# Patient Record
Sex: FEMALE | Race: WHITE | Employment: UNEMPLOYED | URBAN - METROPOLITAN AREA
[De-identification: names, ages, dates, MRNs, and addresses within clinical notes are randomized per-mention and may not be internally consistent; named-entity substitution may affect disease eponyms.]

---

## 2017-01-17 ENCOUNTER — HOSPITAL ENCOUNTER (OUTPATIENT)
Dept: PREADMISSION TESTING | Age: 57
Discharge: HOME | End: 2017-01-17
Payer: MEDICARE

## 2017-01-17 ENCOUNTER — HOSPITAL ENCOUNTER (OUTPATIENT)
Dept: GENERAL RADIOLOGY | Age: 57
Discharge: HOME | End: 2017-01-17
Attending: ORTHOPAEDIC SURGERY
Payer: MEDICARE

## 2017-01-17 VITALS
OXYGEN SATURATION: 98 % | TEMPERATURE: 98.6 F | HEIGHT: 67 IN | DIASTOLIC BLOOD PRESSURE: 69 MMHG | RESPIRATION RATE: 17 BRPM | SYSTOLIC BLOOD PRESSURE: 126 MMHG | WEIGHT: 168.43 LBS | BODY MASS INDEX: 26.44 KG/M2

## 2017-01-17 LAB
ABO + RH BLD: NORMAL
ALBUMIN SERPL BCP-MCNC: 4.1 G/DL (ref 3.5–5)
ALBUMIN/GLOB SERPL: 1.2 {RATIO} (ref 1.1–2.2)
ALP SERPL-CCNC: 113 U/L (ref 45–117)
ALT SERPL-CCNC: 24 U/L (ref 12–78)
ANION GAP BLD CALC-SCNC: 7 MMOL/L (ref 5–15)
APPEARANCE UR: CLEAR
APTT PPP: 28.7 SEC (ref 22.1–32.5)
AST SERPL W P-5'-P-CCNC: 18 U/L (ref 15–37)
ATRIAL RATE: 62 BPM
BACTERIA URNS QL MICRO: NEGATIVE /HPF
BASOPHILS # BLD AUTO: 0 K/UL (ref 0–0.1)
BASOPHILS # BLD: 0 % (ref 0–1)
BILIRUB SERPL-MCNC: 0.2 MG/DL (ref 0.2–1)
BILIRUB UR QL: NEGATIVE
BLOOD GROUP ANTIBODIES SERPL: NORMAL
BUN SERPL-MCNC: 8 MG/DL (ref 6–20)
BUN/CREAT SERPL: 9 (ref 12–20)
CALCIUM SERPL-MCNC: 9 MG/DL (ref 8.5–10.1)
CALCULATED P AXIS, ECG09: 53 DEGREES
CALCULATED R AXIS, ECG10: 60 DEGREES
CALCULATED T AXIS, ECG11: 36 DEGREES
CHLORIDE SERPL-SCNC: 101 MMOL/L (ref 97–108)
CO2 SERPL-SCNC: 32 MMOL/L (ref 21–32)
COLOR UR: ABNORMAL
CREAT SERPL-MCNC: 0.85 MG/DL (ref 0.55–1.02)
DIAGNOSIS, 93000: NORMAL
EOSINOPHIL # BLD: 0 K/UL (ref 0–0.4)
EOSINOPHIL NFR BLD: 0 % (ref 0–7)
EPITH CASTS URNS QL MICRO: ABNORMAL /LPF
ERYTHROCYTE [DISTWIDTH] IN BLOOD BY AUTOMATED COUNT: 13.5 % (ref 11.5–14.5)
EST. AVERAGE GLUCOSE BLD GHB EST-MCNC: 114 MG/DL
GLOBULIN SER CALC-MCNC: 3.3 G/DL (ref 2–4)
GLUCOSE SERPL-MCNC: 85 MG/DL (ref 65–100)
GLUCOSE UR STRIP.AUTO-MCNC: NEGATIVE MG/DL
HBA1C MFR BLD: 5.6 % (ref 4.2–6.3)
HCT VFR BLD AUTO: 41.2 % (ref 35–47)
HGB BLD-MCNC: 12.5 G/DL (ref 11.5–16)
HGB UR QL STRIP: ABNORMAL
HYALINE CASTS URNS QL MICRO: ABNORMAL /LPF (ref 0–5)
INR PPP: 0.9 (ref 0.9–1.1)
KETONES UR QL STRIP.AUTO: NEGATIVE MG/DL
LEUKOCYTE ESTERASE UR QL STRIP.AUTO: NEGATIVE
LYMPHOCYTES # BLD AUTO: 30 % (ref 12–49)
LYMPHOCYTES # BLD: 2.1 K/UL (ref 0.8–3.5)
MCH RBC QN AUTO: 27.3 PG (ref 26–34)
MCHC RBC AUTO-ENTMCNC: 30.3 G/DL (ref 30–36.5)
MCV RBC AUTO: 90 FL (ref 80–99)
MONOCYTES # BLD: 0.6 K/UL (ref 0–1)
MONOCYTES NFR BLD AUTO: 9 % (ref 5–13)
NEUTS SEG # BLD: 4.2 K/UL (ref 1.8–8)
NEUTS SEG NFR BLD AUTO: 61 % (ref 32–75)
NITRITE UR QL STRIP.AUTO: NEGATIVE
P-R INTERVAL, ECG05: 144 MS
PH UR STRIP: 5.5 [PH] (ref 5–8)
PLATELET # BLD AUTO: 245 K/UL (ref 150–400)
POTASSIUM SERPL-SCNC: 4.8 MMOL/L (ref 3.5–5.1)
PROT SERPL-MCNC: 7.4 G/DL (ref 6.4–8.2)
PROT UR STRIP-MCNC: NEGATIVE MG/DL
PROTHROMBIN TIME: 9.5 SEC (ref 9–11.1)
Q-T INTERVAL, ECG07: 414 MS
QRS DURATION, ECG06: 84 MS
QTC CALCULATION (BEZET), ECG08: 420 MS
RBC # BLD AUTO: 4.58 M/UL (ref 3.8–5.2)
RBC #/AREA URNS HPF: ABNORMAL /HPF (ref 0–5)
SODIUM SERPL-SCNC: 140 MMOL/L (ref 136–145)
SP GR UR REFRACTOMETRY: 1.02 (ref 1–1.03)
SPECIMEN EXP DATE BLD: NORMAL
THERAPEUTIC RANGE,PTTT: NORMAL SECS (ref 58–77)
UA: UC IF INDICATED,UAUC: ABNORMAL
UROBILINOGEN UR QL STRIP.AUTO: 0.2 EU/DL (ref 0.2–1)
VENTRICULAR RATE, ECG03: 62 BPM
WBC # BLD AUTO: 7 K/UL (ref 3.6–11)
WBC URNS QL MICRO: ABNORMAL /HPF (ref 0–4)

## 2017-01-17 PROCEDURE — 86900 BLOOD TYPING SEROLOGIC ABO: CPT | Performed by: ORTHOPAEDIC SURGERY

## 2017-01-17 PROCEDURE — 83036 HEMOGLOBIN GLYCOSYLATED A1C: CPT | Performed by: ORTHOPAEDIC SURGERY

## 2017-01-17 PROCEDURE — 81001 URINALYSIS AUTO W/SCOPE: CPT | Performed by: ORTHOPAEDIC SURGERY

## 2017-01-17 PROCEDURE — 71020 XR CHEST PA LAT: CPT

## 2017-01-17 PROCEDURE — 36415 COLL VENOUS BLD VENIPUNCTURE: CPT | Performed by: ORTHOPAEDIC SURGERY

## 2017-01-17 PROCEDURE — 85730 THROMBOPLASTIN TIME PARTIAL: CPT | Performed by: ORTHOPAEDIC SURGERY

## 2017-01-17 PROCEDURE — 85025 COMPLETE CBC W/AUTO DIFF WBC: CPT | Performed by: ORTHOPAEDIC SURGERY

## 2017-01-17 PROCEDURE — 80053 COMPREHEN METABOLIC PANEL: CPT | Performed by: ORTHOPAEDIC SURGERY

## 2017-01-17 PROCEDURE — 85610 PROTHROMBIN TIME: CPT | Performed by: ORTHOPAEDIC SURGERY

## 2017-01-17 PROCEDURE — 93005 ELECTROCARDIOGRAM TRACING: CPT

## 2017-01-17 RX ORDER — VENLAFAXINE HYDROCHLORIDE 150 MG/1
1 TABLET, EXTENDED RELEASE ORAL DAILY
COMMUNITY

## 2017-01-17 RX ORDER — MINERAL OIL
180 ENEMA (ML) RECTAL
COMMUNITY

## 2017-01-17 RX ORDER — METOPROLOL SUCCINATE 25 MG/1
25 TABLET, EXTENDED RELEASE ORAL
COMMUNITY

## 2017-01-17 RX ORDER — HYDROCODONE BITARTRATE AND ACETAMINOPHEN 7.5; 325 MG/1; MG/1
1 TABLET ORAL
COMMUNITY
End: 2017-02-15

## 2017-01-17 NOTE — H&P
PAT Pre-Op History & Physical    Patient: Ruben Michel                  MRN: [id number]          SSN: [id number]  YOB: 1960          Age: 49 y. o. Sex: female                Subjective:   Patient is a 49 y.o.  female who presents with history of chronic neck pain for the past 4 months. History of prior neck surgery 1998. Rates pain as 9/10 and describes as a sharp, burning and grinding pain in posterior neck that radiates to both both arms, with numbness and tingling to the hands. Complaint of decreased  bilaterally, left greater than right. Failed JOON, and narcotic pain medications. The patient was evaluated in the surgeon's office and it was determined that the most appropriate plan of care is to proceed with surgical intervention. Patient's PCP Alessandro Chao MD    History of chronic sinus infections, current smoker for 32 years. Complaint of nasal congestion today. Complaint of history of non productive intermittent cough, denies shortness of breath, and wheezes. Denies fever. Advised if symptoms change to change to follow up with PCP and notify surgeon, verbalized understanding. Patient denies any hypertension or heart problems, stated PCP started metoprolol for heart rate control related to anxiety, per patient report. Past Medical History   Diagnosis Date    Arthritis      osteoarthritis.  spine, left knee, right hip    Back pain     Chronic pain      lower back, hips, neck    DDD (degenerative disc disease), lumbar 2015    DJD (degenerative joint disease)     GERD (gastroesophageal reflux disease)     Heart murmur     Ill-defined condition      Baker's cyst left knee    Ill-defined condition      allergic rhinitis, chronic sinusitis    Ill-defined condition 01/17/2017     overweight- BMI 26.8    Ill-defined condition 2015     positive nasal MRSA swab     Lumbar scoliosis 2015    Psychiatric disorder      anxiety and depression    Septic bursitis 11/30/12     Started on Clindamycin but only took it 1.5 weeks and stopped d/t reflux    Spinal stenosis, lumbar 2015      Past Surgical History   Procedure Laterality Date    Hx cervical fusion  1998     [facility] [address] [facility] Hx lumbar fusion  4/28/15    Hx gyn       hysterectomy     Hx other surgical       skin graft, to face 40 years ago r/t allergic reaction    Hx heent       TMJ surgery- 30 years ago again 1993    Hx orthopaedic Left 1992     hand surgery    Hx orthopaedic Left 2002     wrist surgery    Hx orthopaedic       cervical fusion    Hx orthopaedic  2015     lumbar fusion L3-L5      Prior to Admission medications    Medication Sig Start Date End Date Taking? Authorizing Provider   MULTIVITS,CA,MINERALS/IRON/FA (ONE-A-DAY WOMENS FORMULA PO) Take 1 Gum by mouth daily. Yes Historical Provider   HYDROcodone-acetaminophen (NORCO) 7.5-325 mg per tablet Take 1 Tab by mouth every six (6) hours as needed for Pain. Yes Historical Provider   Venlafaxine 150 mg tr24 Take 1 Tab by mouth daily. Yes Historical Provider   fexofenadine (ALLEGRA) 180 mg tablet Take 180 mg by mouth daily as needed for Allergies. Indications: URTICARIA   Yes Historical Provider   metoprolol succinate (TOPROL-XL) 25 mg XL tablet Take 25 mg by mouth nightly. Yes Historical Provider   omeprazole (PRILOSEC OTC) 20 mg tablet Take 20 mg by mouth daily as needed. Yes Historical Provider     Current Outpatient Prescriptions   Medication Sig    MULTIVITS,CA,MINERALS/IRON/FA (ONE-A-DAY WOMENS FORMULA PO) Take 1 Gum by mouth daily.  HYDROcodone-acetaminophen (NORCO) 7.5-325 mg per tablet Take 1 Tab by mouth every six (6) hours as needed for Pain.  Venlafaxine 150 mg tr24 Take 1 Tab by mouth daily.  fexofenadine (ALLEGRA) 180 mg tablet Take 180 mg by mouth daily as needed for Allergies. Indications: URTICARIA    metoprolol succinate (TOPROL-XL) 25 mg XL tablet Take 25 mg by mouth nightly.     omeprazole (PRILOSEC OTC) 20 mg tablet Take 20 mg by mouth daily as needed. No current facility-administered medications for this encounter. Allergies   Allergen Reactions    Ceclor [Cefaclor] Anaphylaxis    Pcn [Penicillins] Anaphylaxis and Hives     Blistering hives causing skin peeling required skin grafts    Sulfa (Sulfonamide Antibiotics) Anaphylaxis    Aspirin Nausea Only    Codeine Nausea and Vomiting    Erythromycin Itching    Nsaids (Non-Steroidal Anti-Inflammatory Drug) Nausea Only      Social History   Substance Use Topics    Smoking status: Current Every Day Smoker     Packs/day: 1.00     Years: 32.00    Smokeless tobacco: Never Used      Comment: down to 10 cigarettes per day as of 1/17/2017    Alcohol use No      Comment: quit 10 years ago      Family History   Problem Relation Age of Onset    Heart Disease Mother      CHF    Sleep Apnea Mother     Diabetes Mother     Coronary Artery Disease Mother      coronary stent after age 48   [address] [facility] [address] Other Mother      blood clots    Heart Failure Mother     Crohn's Disease Father     Cancer Father      prostate    Depression Sister     MS Brother         Review of Systems    Patient denies visual disturbances, mouth sores, and loose teeth. Complaint of some nasal congestion, denies purulent drainage, denies fever, chills or sweats. Complaint of non productive intermittent cough, denies shortness of breath, and wheezes. Denies chest pain, tightness, palpitations, dizziness, and lightheadedness. Denies diarrhea, constipation, and abdominal pain. Patient denies urinary problems including dysuria, hesitancy, urgency, or incontinence. Complaint of neck pain that radiates down both arms. Complaint of intermittent numbness and tingling in bilateral arms. Complaint of decreased  bilaterally, left greater than right. Complaint of chronic back pain. Denies skin breakdown, rashes, insect bites or open area.  Denies excessive urination, excessive thirst, fatigue and malaise. Objective:     Patient Vitals for the past 24 hrs:   Temp Resp BP SpO2   17 1019 98.6 °F (37 °C) 17 126/69 98 %   HR 72  Wt Readings from Last 1 Encounters:   17 76.4 kg (168 lb 6.9 oz)     Body mass index is 26.78 kg/(m^2). Temp (24hrs), Av.6 °F (37 °C), Min:98.6 °F (37 °C), Max:98.6 °F (37 °C)    Physical Exam:     General: Pleasant, cooperative, no apparent distress, appears stated age. Eyes: Conjunctivae/corneas clear. EOMs intact. Nose: Nares normal.   Mouth/Throat: Lips, mucosa, and tongue normal. Upper dentures in place. Missing lower morals on both sides other lower teeth and gums intact. Neck: Supple, symmetrical, trachea midline. No carotid bruits. ROM in neck is limited by pain. Back: Symmetric. Lungs: Clear to auscultation bilaterally. Heart: Regular rate and rhythm, S1, S2 normal. No murmur, click, rub or gallop. Abdomen: Soft, non-tender. No distension. Bowel sounds normal.       Musculoskeletal:  Tests normal strength in all 4 extremities. Equal . Good dorsal and plantar flexion. ROM in arms limited due pain. Gait is antalgic. Extremities:  Extremities normal, atraumatic, no cyanosis or edema. Calves supple, non tender to palpation. Pulses: 2+ and symmetric bilateral upper extremities. Skin: Skin color, texture, turgor normal.  No rashes or lesions. Lymph nodes: No adenopathy. Neurologic: Alert and oriented to person, place and time. CN II-XII grossly intact. Labs:   Recent Results (from the past 67 hour(s))   CULTURE, MRSA    Collection Time: 17 10:57 AM   Result Value Ref Range    Special Requests: NO SPECIAL REQUESTS      Culture result: MRSA PRESENT      Culture result:            Screening of patient nares for MRSA is for surveillance purposes and, if positive, to facilitate isolation considerations in high risk settings.  It is not intended for automatic decolonization interventions per se as regimens are not sufficiently effective to warrant routine use. CBC WITH AUTOMATED DIFF    Collection Time: 01/17/17 11:13 AM   Result Value Ref Range    WBC 7.0 3.6 - 11.0 K/uL    RBC 4.58 3.80 - 5.20 M/uL    HGB 12.5 11.5 - 16.0 g/dL    HCT 41.2 35.0 - 47.0 %    MCV 90.0 80.0 - 99.0 FL    MCH 27.3 26.0 - 34.0 PG    MCHC 30.3 30.0 - 36.5 g/dL    RDW 13.5 11.5 - 14.5 %    PLATELET 566 525 - 547 K/uL    NEUTROPHILS 61 32 - 75 %    LYMPHOCYTES 30 12 - 49 %    MONOCYTES 9 5 - 13 %    EOSINOPHILS 0 0 - 7 %    BASOPHILS 0 0 - 1 %    ABS. NEUTROPHILS 4.2 1.8 - 8.0 K/UL    ABS. LYMPHOCYTES 2.1 0.8 - 3.5 K/UL    ABS. MONOCYTES 0.6 0.0 - 1.0 K/UL    ABS. EOSINOPHILS 0.0 0.0 - 0.4 K/UL    ABS. BASOPHILS 0.0 0.0 - 0.1 K/UL   METABOLIC PANEL, COMPREHENSIVE    Collection Time: 01/17/17 11:13 AM   Result Value Ref Range    Sodium 140 136 - 145 mmol/L    Potassium 4.8 3.5 - 5.1 mmol/L    Chloride 101 97 - 108 mmol/L    CO2 32 21 - 32 mmol/L    Anion gap 7 5 - 15 mmol/L    Glucose 85 65 - 100 mg/dL    BUN 8 6 - 20 MG/DL    Creatinine 0.85 0.55 - 1.02 MG/DL    BUN/Creatinine ratio 9 (L) 12 - 20      GFR est AA >60 >60 ml/min/1.73m2    GFR est non-AA >60 >60 ml/min/1.73m2    Calcium 9.0 8.5 - 10.1 MG/DL    Bilirubin, total 0.2 0.2 - 1.0 MG/DL    ALT 24 12 - 78 U/L    AST 18 15 - 37 U/L    Alk.  phosphatase 113 45 - 117 U/L    Protein, total 7.4 6.4 - 8.2 g/dL    Albumin 4.1 3.5 - 5.0 g/dL    Globulin 3.3 2.0 - 4.0 g/dL    A-G Ratio 1.2 1.1 - 2.2     HEMOGLOBIN A1C WITH EAG    Collection Time: 01/17/17 11:13 AM   Result Value Ref Range    Hemoglobin A1c 5.6 4.2 - 6.3 %    Est. average glucose 114 mg/dL   PROTHROMBIN TIME + INR    Collection Time: 01/17/17 11:13 AM   Result Value Ref Range    INR 0.9 0.9 - 1.1      Prothrombin time 9.5 9.0 - 11.1 sec   PTT    Collection Time: 01/17/17 11:13 AM   Result Value Ref Range    aPTT 28.7 22.1 - 32.5 sec    aPTT, therapeutic range     58.0 - 77.0 SECS   URINALYSIS W/ REFLEX CULTURE    Collection Time: 01/17/17 11:13 AM   Result Value Ref Range    Color YELLOW/STRAW      Appearance CLEAR CLEAR      Specific gravity 1.021 1.003 - 1.030      pH (UA) 5.5 5.0 - 8.0      Protein NEGATIVE  NEG mg/dL    Glucose NEGATIVE  NEG mg/dL    Ketone NEGATIVE  NEG mg/dL    Bilirubin NEGATIVE  NEG      Blood SMALL (A) NEG      Urobilinogen 0.2 0.2 - 1.0 EU/dL    Nitrites NEGATIVE  NEG      Leukocyte Esterase NEGATIVE  NEG      WBC 0-4 0 - 4 /hpf    RBC 5-10 0 - 5 /hpf    Epithelial cells FEW FEW /lpf    Bacteria NEGATIVE  NEG /hpf    UA:UC IF INDICATED CULTURE NOT INDICATED BY UA RESULT CNI      Hyaline Cast 0-2 0 - 5 /lpf   TYPE & SCREEN    Collection Time: 01/17/17 11:13 AM   Result Value Ref Range    Crossmatch Expiration 01/31/2017     ABO/Rh(D) A POSITIVE     Antibody screen NEG    EKG, 12 LEAD, INITIAL    Collection Time: 01/17/17 11:45 AM   Result Value Ref Range    Ventricular Rate 62 BPM    Atrial Rate 62 BPM    P-R Interval 144 ms    QRS Duration 84 ms    Q-T Interval 414 ms    QTC Calculation (Bezet) 420 ms    Calculated P Axis 53 degrees    Calculated R Axis 60 degrees    Calculated T Axis 36 degrees    Diagnosis       Normal sinus rhythm  Normal ECG  When compared with ECG of 21-APR-2015 13:33,  No significant change was found  Confirmed by Izzy Holman MD ([id number]) on 1/17/2017 2:39:59 PM     CXR  Result Information   Status Provider Status      Final result (Exam End: 1/17/2017 11:58 AM) Open    Study Result   INDICATION: Smoking history, heart murmur, back pain      COMPARISON: 4/21/2015     FINDINGS: PA and lateral views of the chest demonstrate a stable  cardiomediastinal silhouette and clear lungs bilaterally. The visualized osseous  structures are unremarkable.     IMPRESSION: No acute process       Assessment:     Cervical spinal stenosis [M48.02]    Plan:     Scheduled for Posterior C7-T1 foraminotomy and fusion C6--T1 with lateral mass screws. Labs, CXR and EKG done per surgeon's orders.  Labs, CXR and EKG reviewed, unremarkable.         Donna Steinberg, NP

## 2017-01-17 NOTE — PERIOP NOTES
[facility]  PREOPERATIVE INSTRUCTIONS    Surgery Date:   1/31/2017  Surgery arrival time given by surgeon: NO   If [facility] staff will call you between 4 PM- 8 PM the day before surgery with your arrival time. If your surgery is on a Monday, we will call you the preceding Friday. Please call [phone] after 8 PM if you did not receive your arrival time. 1. Please report at the designated time to the [address] [place] [address] [address] [facility]. Bring your insurance card, photo identification, and any copayment ( if applicable). 2. You must have a responsible adult to drive you home. You need to have a responsible adult to stay with you the first 24 hours after surgery if you are going home the same day of your surgery and you should not drive a car for 24 hours following your surgery. 3. Nothing to eat or drink after midnight the night before surgery. This includes no water, gum, mints, coffee, juice, etc.  Please note special instructions, if applicable, below for medications. 4. MEDICATIONS TO TAKE THE MORNING OF SURGERY WITH A SIP OF WATER: _____venlafaxineER ,omeprazole,allegra if needed_________        Your prescription pain medicine may be taken with a sip of water the morning of surgery  5. No alcoholic beverages 24 hours before or after your surgery. 6. If you are being admitted to the hospital, please leave personal belongings/luggage in your car until you have an assigned hospital room number. 7. Stop Aspirin and/or any non-steroidal anti-inflammatory drugs (i.e. Ibuprofen, Naproxen, Advil, Aleve) as directed by your surgeon. You may take Tylenol. 8. Stop herbal supplements 1 week prior to surgery. 9. If you are currently taking Plavix, Coumadin,or any other blood-thinning/anticoagulant medication contact your surgeon for instructions. 10. Please wear comfortable clothes. Wear your glasses instead of contacts. We ask that all money, jewelry and valuables be left at home.  Wear no make-up, particularly mascara, the day of surgery. 11.  All body piercings, rings and jewelry need to be removed and left at home. Please wear your hair loose or down. Please no pony-tails, buns, or any metal hair accessories. If you shower the morning of surgery, please do not apply any lotions, powders, or deodorants afterwards. Do not shave any body area within 24 hours of your surgery. 12. Please follow all instructions to avoid any potential surgical cancellation. 13. Should your physical condition change, (i.e. fever, cold, flu, etc.) please notify your surgeon as soon as possible. 14. It is important to be on time. If a situation occurs where you may be delayed, please call:  [phone] / [phone] on the day of surgery. 15. The Preadmission Testing staff can be reached at [phone]. [place] 12. Bring your completed Medication Reconciliation sheet with your the morning of surgery  Special instructions:   · Free  Parking between [address]  The patient was contacted  in person. She  verbalizes  understanding of all instructions   Medications reviewed and med reconciliation sheets for prescriptions given to patient for review & return day of surgery. Special Instructions:  · Use Chlorhexidine Care Fusion wash and sponges 3 days prior to surgery as instructed. · Incentive spirometer given with instructions to practice at home and bring back to the hospital on the day of surgery. · [facility] will contact you if your Hemoglobin A1C is greater than 7.5. · Ensure/Glucerna  sample, nutritional information, and Ensure/Glucerna coupon given. · Pain pamphlet and Call Don't Fall reminder reviewed with patient.   ·  parking is complimentary Monday - Friday 7 am - 5 pm  · Bring PTA Medication list day of surgery with the last doses taken documented

## 2017-01-18 LAB
BACTERIA SPEC CULT: NORMAL
BACTERIA SPEC CULT: NORMAL
SERVICE CMNT-IMP: NORMAL

## 2017-01-23 NOTE — PERIOP NOTES
Patient's DOS has changed from 1/31/2017 to 1/24/2017. MRSA plan has not been received as of yet from Dr. Maria Esther Mcgregor office. Left a VM for Jamee Gore at Dr. Maria Esther Mcgregor office requesting the signed MRSA plan and also orders with new DOS, 1/24/2017. Asked that these documents be faxed to main preop's fax number.

## 2017-02-03 NOTE — PERIOP NOTES
Elan Arroyo from Dr. Eloy Mireles office called to inquire how the patient could get another set of HCG sponges. Informed her to have patient come to the  of [facility] and our  will provide her with them. No need to register. Also requested new orders with new DOS. DOS: 2/14/2017.

## 2017-02-12 ENCOUNTER — ANESTHESIA EVENT (OUTPATIENT)
Dept: SURGERY | Age: 57
DRG: 473 | End: 2017-02-12
Payer: MEDICARE

## 2017-02-13 ENCOUNTER — APPOINTMENT (OUTPATIENT)
Dept: GENERAL RADIOLOGY | Age: 57
DRG: 473 | End: 2017-02-13
Attending: ORTHOPAEDIC SURGERY
Payer: MEDICARE

## 2017-02-13 ENCOUNTER — HOSPITAL ENCOUNTER (INPATIENT)
Age: 57
LOS: 2 days | Discharge: HOME HEALTH | DRG: 473 | End: 2017-02-15
Attending: ORTHOPAEDIC SURGERY | Admitting: ORTHOPAEDIC SURGERY
Payer: MEDICARE

## 2017-02-13 ENCOUNTER — ANESTHESIA (OUTPATIENT)
Dept: SURGERY | Age: 57
DRG: 473 | End: 2017-02-13
Payer: MEDICARE

## 2017-02-13 ENCOUNTER — SURGERY (OUTPATIENT)
Age: 57
End: 2017-02-13

## 2017-02-13 PROBLEM — M48.02 FORAMINAL STENOSIS OF CERVICAL REGION: Status: ACTIVE | Noted: 2017-02-13

## 2017-02-13 PROBLEM — S12.9XXA PSEUDOARTHROSIS OF CERVICAL SPINE (HCC): Status: ACTIVE | Noted: 2017-02-13

## 2017-02-13 PROBLEM — M48.02 CERVICAL STENOSIS OF SPINE: Status: ACTIVE | Noted: 2017-02-13

## 2017-02-13 LAB
ABO + RH BLD: NORMAL
BLOOD GROUP ANTIBODIES SERPL: NORMAL
SPECIMEN EXP DATE BLD: NORMAL

## 2017-02-13 PROCEDURE — C1713 ANCHOR/SCREW BN/BN,TIS/BN: HCPCS | Performed by: ORTHOPAEDIC SURGERY

## 2017-02-13 PROCEDURE — 77030013079 HC BLNKT BAIR HGGR 3M -A: Performed by: NURSE ANESTHETIST, CERTIFIED REGISTERED

## 2017-02-13 PROCEDURE — 74011250636 HC RX REV CODE- 250/636: Performed by: ANESTHESIOLOGY

## 2017-02-13 PROCEDURE — 74011250637 HC RX REV CODE- 250/637: Performed by: PHYSICIAN ASSISTANT

## 2017-02-13 PROCEDURE — 77030034849: Performed by: ORTHOPAEDIC SURGERY

## 2017-02-13 PROCEDURE — 0RG4071 FUSION OF CERVICOTHORACIC VERTEBRAL JOINT WITH AUTOLOGOUS TISSUE SUBSTITUTE, POSTERIOR APPROACH, POSTERIOR COLUMN, OPEN APPROACH: ICD-10-PCS | Performed by: ORTHOPAEDIC SURGERY

## 2017-02-13 PROCEDURE — 77030018846 HC SOL IRR STRL H20 ICUM -A: Performed by: ORTHOPAEDIC SURGERY

## 2017-02-13 PROCEDURE — 77030029099 HC BN WAX SSPC -A: Performed by: ORTHOPAEDIC SURGERY

## 2017-02-13 PROCEDURE — 77030004402 HC BUR NEUR STRY -C: Performed by: ORTHOPAEDIC SURGERY

## 2017-02-13 PROCEDURE — 74011250636 HC RX REV CODE- 250/636: Performed by: ORTHOPAEDIC SURGERY

## 2017-02-13 PROCEDURE — 77030008771 HC TU NG SALEM SUMP -A: Performed by: NURSE ANESTHETIST, CERTIFIED REGISTERED

## 2017-02-13 PROCEDURE — 77030018673: Performed by: ORTHOPAEDIC SURGERY

## 2017-02-13 PROCEDURE — 77030003666 HC NDL SPINAL BD -A: Performed by: ORTHOPAEDIC SURGERY

## 2017-02-13 PROCEDURE — 76010000172 HC OR TIME 2.5 TO 3 HR INTENSV-TIER 1: Performed by: ORTHOPAEDIC SURGERY

## 2017-02-13 PROCEDURE — L0172 CERV COL SR FOAM 2PC PRE OTS: HCPCS | Performed by: ORTHOPAEDIC SURGERY

## 2017-02-13 PROCEDURE — 74011250636 HC RX REV CODE- 250/636

## 2017-02-13 PROCEDURE — 77030008684 HC TU ET CUF COVD -B: Performed by: NURSE ANESTHETIST, CERTIFIED REGISTERED

## 2017-02-13 PROCEDURE — 36415 COLL VENOUS BLD VENIPUNCTURE: CPT | Performed by: ORTHOPAEDIC SURGERY

## 2017-02-13 PROCEDURE — 77030019908 HC STETH ESOPH SIMS -A: Performed by: NURSE ANESTHETIST, CERTIFIED REGISTERED

## 2017-02-13 PROCEDURE — 86900 BLOOD TYPING SEROLOGIC ABO: CPT | Performed by: ORTHOPAEDIC SURGERY

## 2017-02-13 PROCEDURE — 77030032490 HC SLV COMPR SCD KNE COVD -B: Performed by: ORTHOPAEDIC SURGERY

## 2017-02-13 PROCEDURE — 76001 XR FLUOROSCOPY OVER 60 MINUTES: CPT

## 2017-02-13 PROCEDURE — 77030018723 HC ELCTRD BLD COVD -A: Performed by: ORTHOPAEDIC SURGERY

## 2017-02-13 PROCEDURE — 74011000250 HC RX REV CODE- 250

## 2017-02-13 PROCEDURE — 76060000036 HC ANESTHESIA 2.5 TO 3 HR: Performed by: ORTHOPAEDIC SURGERY

## 2017-02-13 PROCEDURE — 77030020268 HC MISC GENERAL SUPPLY: Performed by: ORTHOPAEDIC SURGERY

## 2017-02-13 PROCEDURE — 77030034475 HC MISC IMPL SPN: Performed by: ORTHOPAEDIC SURGERY

## 2017-02-13 PROCEDURE — 74011000272 HC RX REV CODE- 272: Performed by: ORTHOPAEDIC SURGERY

## 2017-02-13 PROCEDURE — 74011000250 HC RX REV CODE- 250: Performed by: ORTHOPAEDIC SURGERY

## 2017-02-13 PROCEDURE — 77030034274 HC GRFT BN CHIP ALLGRFT 10CC REGE -I: Performed by: ORTHOPAEDIC SURGERY

## 2017-02-13 PROCEDURE — 77030031139 HC SUT VCRL2 J&J -A: Performed by: ORTHOPAEDIC SURGERY

## 2017-02-13 PROCEDURE — 76210000016 HC OR PH I REC 1 TO 1.5 HR: Performed by: ORTHOPAEDIC SURGERY

## 2017-02-13 PROCEDURE — 65270000029 HC RM PRIVATE

## 2017-02-13 PROCEDURE — 77030035565 HC ST SCR SPN LOK CAP STRMLN PHOE -B: Performed by: ORTHOPAEDIC SURGERY

## 2017-02-13 PROCEDURE — 74011250636 HC RX REV CODE- 250/636: Performed by: PHYSICIAN ASSISTANT

## 2017-02-13 PROCEDURE — 77030020061 HC IV BLD WRMR ADMIN SET 3M -B: Performed by: NURSE ANESTHETIST, CERTIFIED REGISTERED

## 2017-02-13 PROCEDURE — 77030030722 HC PIN SKULL MAYFLD INLC -B: Performed by: ORTHOPAEDIC SURGERY

## 2017-02-13 PROCEDURE — 77030018836 HC SOL IRR NACL ICUM -A: Performed by: ORTHOPAEDIC SURGERY

## 2017-02-13 PROCEDURE — 77030002982 HC SUT POLYSRB J&J -A: Performed by: ORTHOPAEDIC SURGERY

## 2017-02-13 PROCEDURE — 77030020782 HC GWN BAIR PAWS FLX 3M -B

## 2017-02-13 DEVICE — SCR SET CERV LCK -- STREAMLINE CT: Type: IMPLANTABLE DEVICE | Site: POSTERIOR CERVICAL | Status: FUNCTIONAL

## 2017-02-13 DEVICE — GRAFT BNE SUB 10CC CORT CANC DBM CRYOGENICALLY PRESERVED: Type: IMPLANTABLE DEVICE | Site: POSTERIOR CERVICAL | Status: FUNCTIONAL

## 2017-02-13 DEVICE — SCR BNE SPNE POLYAXL 3.5X14MM -- STREAMLINE CT: Type: IMPLANTABLE DEVICE | Site: POSTERIOR CERVICAL | Status: FUNCTIONAL

## 2017-02-13 RX ORDER — FACIAL-BODY WIPES
10 EACH TOPICAL DAILY PRN
Status: DISCONTINUED | OUTPATIENT
Start: 2017-02-15 | End: 2017-02-15 | Stop reason: HOSPADM

## 2017-02-13 RX ORDER — SUFENTANIL CITRATE 50 UG/ML
INJECTION EPIDURAL; INTRAVENOUS
Status: DISCONTINUED | OUTPATIENT
Start: 2017-02-13 | End: 2017-02-13 | Stop reason: HOSPADM

## 2017-02-13 RX ORDER — BACITRACIN 50000 [IU]/1
50000 INJECTION, POWDER, FOR SOLUTION INTRAMUSCULAR ONCE
Status: ACTIVE | OUTPATIENT
Start: 2017-02-13 | End: 2017-02-14

## 2017-02-13 RX ORDER — MIDAZOLAM HYDROCHLORIDE 1 MG/ML
1 INJECTION, SOLUTION INTRAMUSCULAR; INTRAVENOUS AS NEEDED
Status: DISCONTINUED | OUTPATIENT
Start: 2017-02-13 | End: 2017-02-13 | Stop reason: HOSPADM

## 2017-02-13 RX ORDER — OXYCODONE HYDROCHLORIDE 5 MG/1
10 TABLET ORAL
Status: DISCONTINUED | OUTPATIENT
Start: 2017-02-13 | End: 2017-02-14

## 2017-02-13 RX ORDER — LIDOCAINE HYDROCHLORIDE 10 MG/ML
0.1 INJECTION, SOLUTION EPIDURAL; INFILTRATION; INTRACAUDAL; PERINEURAL AS NEEDED
Status: DISCONTINUED | OUTPATIENT
Start: 2017-02-13 | End: 2017-02-13 | Stop reason: HOSPADM

## 2017-02-13 RX ORDER — HYDROMORPHONE HYDROCHLORIDE 2 MG/ML
INJECTION, SOLUTION INTRAMUSCULAR; INTRAVENOUS; SUBCUTANEOUS AS NEEDED
Status: DISCONTINUED | OUTPATIENT
Start: 2017-02-13 | End: 2017-02-13 | Stop reason: HOSPADM

## 2017-02-13 RX ORDER — SODIUM CHLORIDE, SODIUM LACTATE, POTASSIUM CHLORIDE, CALCIUM CHLORIDE 600; 310; 30; 20 MG/100ML; MG/100ML; MG/100ML; MG/100ML
100 INJECTION, SOLUTION INTRAVENOUS CONTINUOUS
Status: DISCONTINUED | OUTPATIENT
Start: 2017-02-13 | End: 2017-02-13 | Stop reason: HOSPADM

## 2017-02-13 RX ORDER — ALBUTEROL SULFATE 0.83 MG/ML
2.5 SOLUTION RESPIRATORY (INHALATION) AS NEEDED
Status: DISCONTINUED | OUTPATIENT
Start: 2017-02-13 | End: 2017-02-13 | Stop reason: HOSPADM

## 2017-02-13 RX ORDER — SUCCINYLCHOLINE CHLORIDE 20 MG/ML
INJECTION INTRAMUSCULAR; INTRAVENOUS AS NEEDED
Status: DISCONTINUED | OUTPATIENT
Start: 2017-02-13 | End: 2017-02-13 | Stop reason: HOSPADM

## 2017-02-13 RX ORDER — HYDROMORPHONE HYDROCHLORIDE 1 MG/ML
0.5 INJECTION, SOLUTION INTRAMUSCULAR; INTRAVENOUS; SUBCUTANEOUS
Status: DISCONTINUED | OUTPATIENT
Start: 2017-02-13 | End: 2017-02-15 | Stop reason: HOSPADM

## 2017-02-13 RX ORDER — SUFENTANIL CITRATE 50 UG/ML
INJECTION EPIDURAL; INTRAVENOUS AS NEEDED
Status: DISCONTINUED | OUTPATIENT
Start: 2017-02-13 | End: 2017-02-13 | Stop reason: HOSPADM

## 2017-02-13 RX ORDER — AMOXICILLIN 250 MG
1 CAPSULE ORAL 2 TIMES DAILY
Status: DISCONTINUED | OUTPATIENT
Start: 2017-02-13 | End: 2017-02-15 | Stop reason: HOSPADM

## 2017-02-13 RX ORDER — MIDAZOLAM HYDROCHLORIDE 1 MG/ML
INJECTION, SOLUTION INTRAMUSCULAR; INTRAVENOUS AS NEEDED
Status: DISCONTINUED | OUTPATIENT
Start: 2017-02-13 | End: 2017-02-13 | Stop reason: HOSPADM

## 2017-02-13 RX ORDER — LIDOCAINE HYDROCHLORIDE 20 MG/ML
INJECTION, SOLUTION EPIDURAL; INFILTRATION; INTRACAUDAL; PERINEURAL AS NEEDED
Status: DISCONTINUED | OUTPATIENT
Start: 2017-02-13 | End: 2017-02-13 | Stop reason: HOSPADM

## 2017-02-13 RX ORDER — FENTANYL CITRATE 50 UG/ML
50 INJECTION, SOLUTION INTRAMUSCULAR; INTRAVENOUS AS NEEDED
Status: DISCONTINUED | OUTPATIENT
Start: 2017-02-13 | End: 2017-02-13 | Stop reason: HOSPADM

## 2017-02-13 RX ORDER — NALOXONE HYDROCHLORIDE 0.4 MG/ML
0.4 INJECTION, SOLUTION INTRAMUSCULAR; INTRAVENOUS; SUBCUTANEOUS AS NEEDED
Status: DISCONTINUED | OUTPATIENT
Start: 2017-02-13 | End: 2017-02-15 | Stop reason: HOSPADM

## 2017-02-13 RX ORDER — DIPHENHYDRAMINE HYDROCHLORIDE 50 MG/ML
12.5 INJECTION, SOLUTION INTRAMUSCULAR; INTRAVENOUS AS NEEDED
Status: DISCONTINUED | OUTPATIENT
Start: 2017-02-13 | End: 2017-02-13 | Stop reason: HOSPADM

## 2017-02-13 RX ORDER — ONDANSETRON 2 MG/ML
4 INJECTION INTRAMUSCULAR; INTRAVENOUS AS NEEDED
Status: DISCONTINUED | OUTPATIENT
Start: 2017-02-13 | End: 2017-02-13 | Stop reason: HOSPADM

## 2017-02-13 RX ORDER — SODIUM CHLORIDE 9 MG/ML
125 INJECTION, SOLUTION INTRAVENOUS CONTINUOUS
Status: DISPENSED | OUTPATIENT
Start: 2017-02-13 | End: 2017-02-14

## 2017-02-13 RX ORDER — SODIUM CHLORIDE, SODIUM LACTATE, POTASSIUM CHLORIDE, CALCIUM CHLORIDE 600; 310; 30; 20 MG/100ML; MG/100ML; MG/100ML; MG/100ML
INJECTION, SOLUTION INTRAVENOUS
Status: DISCONTINUED | OUTPATIENT
Start: 2017-02-13 | End: 2017-02-13 | Stop reason: HOSPADM

## 2017-02-13 RX ORDER — VANCOMYCIN HYDROCHLORIDE 1 G/20ML
1 INJECTION, POWDER, LYOPHILIZED, FOR SOLUTION INTRAVENOUS ONCE
Status: DISCONTINUED | OUTPATIENT
Start: 2017-02-13 | End: 2017-02-13

## 2017-02-13 RX ORDER — POLYETHYLENE GLYCOL 3350 17 G/17G
17 POWDER, FOR SOLUTION ORAL DAILY
Status: DISCONTINUED | OUTPATIENT
Start: 2017-02-14 | End: 2017-02-15 | Stop reason: HOSPADM

## 2017-02-13 RX ORDER — VANCOMYCIN HYDROCHLORIDE 1 G/20ML
INJECTION, POWDER, LYOPHILIZED, FOR SOLUTION INTRAVENOUS AS NEEDED
Status: DISCONTINUED | OUTPATIENT
Start: 2017-02-13 | End: 2017-02-13 | Stop reason: HOSPADM

## 2017-02-13 RX ORDER — PROPOFOL 10 MG/ML
INJECTION, EMULSION INTRAVENOUS AS NEEDED
Status: DISCONTINUED | OUTPATIENT
Start: 2017-02-13 | End: 2017-02-13 | Stop reason: HOSPADM

## 2017-02-13 RX ORDER — PROPOFOL 10 MG/ML
INJECTION, EMULSION INTRAVENOUS
Status: DISCONTINUED | OUTPATIENT
Start: 2017-02-13 | End: 2017-02-13 | Stop reason: HOSPADM

## 2017-02-13 RX ORDER — SODIUM CHLORIDE 0.9 % (FLUSH) 0.9 %
5-10 SYRINGE (ML) INJECTION AS NEEDED
Status: DISCONTINUED | OUTPATIENT
Start: 2017-02-13 | End: 2017-02-15 | Stop reason: HOSPADM

## 2017-02-13 RX ORDER — SODIUM CHLORIDE, SODIUM LACTATE, POTASSIUM CHLORIDE, CALCIUM CHLORIDE 600; 310; 30; 20 MG/100ML; MG/100ML; MG/100ML; MG/100ML
150 INJECTION, SOLUTION INTRAVENOUS CONTINUOUS
Status: DISCONTINUED | OUTPATIENT
Start: 2017-02-13 | End: 2017-02-13 | Stop reason: HOSPADM

## 2017-02-13 RX ORDER — FENTANYL CITRATE 50 UG/ML
INJECTION, SOLUTION INTRAMUSCULAR; INTRAVENOUS AS NEEDED
Status: DISCONTINUED | OUTPATIENT
Start: 2017-02-13 | End: 2017-02-13 | Stop reason: HOSPADM

## 2017-02-13 RX ORDER — ONDANSETRON 2 MG/ML
4 INJECTION INTRAMUSCULAR; INTRAVENOUS
Status: DISCONTINUED | OUTPATIENT
Start: 2017-02-13 | End: 2017-02-15 | Stop reason: HOSPADM

## 2017-02-13 RX ORDER — SODIUM CHLORIDE 0.9 % (FLUSH) 0.9 %
5-10 SYRINGE (ML) INJECTION EVERY 8 HOURS
Status: DISCONTINUED | OUTPATIENT
Start: 2017-02-14 | End: 2017-02-15 | Stop reason: HOSPADM

## 2017-02-13 RX ORDER — METOPROLOL SUCCINATE 25 MG/1
25 TABLET, EXTENDED RELEASE ORAL
Status: DISCONTINUED | OUTPATIENT
Start: 2017-02-13 | End: 2017-02-15 | Stop reason: HOSPADM

## 2017-02-13 RX ORDER — ACETAMINOPHEN 325 MG/1
650 TABLET ORAL EVERY 6 HOURS
Status: DISCONTINUED | OUTPATIENT
Start: 2017-02-13 | End: 2017-02-15 | Stop reason: HOSPADM

## 2017-02-13 RX ORDER — DEXAMETHASONE SODIUM PHOSPHATE 4 MG/ML
4 INJECTION, SOLUTION INTRA-ARTICULAR; INTRALESIONAL; INTRAMUSCULAR; INTRAVENOUS; SOFT TISSUE
Status: DISCONTINUED | OUTPATIENT
Start: 2017-02-13 | End: 2017-02-15 | Stop reason: HOSPADM

## 2017-02-13 RX ORDER — HYDROMORPHONE HYDROCHLORIDE 1 MG/ML
1 INJECTION, SOLUTION INTRAMUSCULAR; INTRAVENOUS; SUBCUTANEOUS
Status: DISCONTINUED | OUTPATIENT
Start: 2017-02-13 | End: 2017-02-13 | Stop reason: HOSPADM

## 2017-02-13 RX ORDER — OXYCODONE HYDROCHLORIDE 5 MG/1
5 TABLET ORAL
Status: DISCONTINUED | OUTPATIENT
Start: 2017-02-13 | End: 2017-02-14

## 2017-02-13 RX ORDER — ONDANSETRON 2 MG/ML
INJECTION INTRAMUSCULAR; INTRAVENOUS AS NEEDED
Status: DISCONTINUED | OUTPATIENT
Start: 2017-02-13 | End: 2017-02-13 | Stop reason: HOSPADM

## 2017-02-13 RX ORDER — PANTOPRAZOLE SODIUM 40 MG/1
40 TABLET, DELAYED RELEASE ORAL
Status: DISCONTINUED | OUTPATIENT
Start: 2017-02-13 | End: 2017-02-15 | Stop reason: HOSPADM

## 2017-02-13 RX ORDER — ROCURONIUM BROMIDE 10 MG/ML
INJECTION, SOLUTION INTRAVENOUS AS NEEDED
Status: DISCONTINUED | OUTPATIENT
Start: 2017-02-13 | End: 2017-02-13 | Stop reason: HOSPADM

## 2017-02-13 RX ORDER — DIAZEPAM 5 MG/1
5 TABLET ORAL
Status: DISCONTINUED | OUTPATIENT
Start: 2017-02-13 | End: 2017-02-15 | Stop reason: HOSPADM

## 2017-02-13 RX ORDER — VENLAFAXINE HYDROCHLORIDE 150 MG/1
150 CAPSULE, EXTENDED RELEASE ORAL DAILY
Status: DISCONTINUED | OUTPATIENT
Start: 2017-02-14 | End: 2017-02-15 | Stop reason: HOSPADM

## 2017-02-13 RX ADMIN — Medication 1 TABLET: at 18:58

## 2017-02-13 RX ADMIN — GELATIN ABSORBABLE SPONGE SIZE 100 1 EACH: MISC at 14:47

## 2017-02-13 RX ADMIN — MIDAZOLAM HYDROCHLORIDE 1 MG: 1 INJECTION, SOLUTION INTRAMUSCULAR; INTRAVENOUS at 14:30

## 2017-02-13 RX ADMIN — SUFENTANIL CITRATE 0.1 MCG/KG/HR: 50 INJECTION EPIDURAL; INTRAVENOUS at 13:38

## 2017-02-13 RX ADMIN — SUFENTANIL CITRATE 5 MCG: 50 INJECTION EPIDURAL; INTRAVENOUS at 14:00

## 2017-02-13 RX ADMIN — OXYCODONE HYDROCHLORIDE 10 MG: 5 TABLET ORAL at 22:42

## 2017-02-13 RX ADMIN — ROCURONIUM BROMIDE 20 MG: 10 INJECTION, SOLUTION INTRAVENOUS at 13:34

## 2017-02-13 RX ADMIN — ACETAMINOPHEN 650 MG: 325 TABLET ORAL at 18:58

## 2017-02-13 RX ADMIN — PROPOFOL 200 MG: 10 INJECTION, EMULSION INTRAVENOUS at 13:34

## 2017-02-13 RX ADMIN — SODIUM CHLORIDE, SODIUM LACTATE, POTASSIUM CHLORIDE, AND CALCIUM CHLORIDE 150 ML/HR: 600; 310; 30; 20 INJECTION, SOLUTION INTRAVENOUS at 09:51

## 2017-02-13 RX ADMIN — VANCOMYCIN HYDROCHLORIDE 1000 MG: 1 INJECTION, POWDER, LYOPHILIZED, FOR SOLUTION INTRAVENOUS at 12:35

## 2017-02-13 RX ADMIN — THROMBIN, TOPICAL (BOVINE) 20000 UNITS: KIT at 14:48

## 2017-02-13 RX ADMIN — MIDAZOLAM HYDROCHLORIDE 1.5 MG: 1 INJECTION, SOLUTION INTRAMUSCULAR; INTRAVENOUS at 15:00

## 2017-02-13 RX ADMIN — SODIUM CHLORIDE, SODIUM LACTATE, POTASSIUM CHLORIDE, CALCIUM CHLORIDE: 600; 310; 30; 20 INJECTION, SOLUTION INTRAVENOUS at 13:37

## 2017-02-13 RX ADMIN — FENTANYL CITRATE 100 MCG: 50 INJECTION, SOLUTION INTRAMUSCULAR; INTRAVENOUS at 16:00

## 2017-02-13 RX ADMIN — VANCOMYCIN HYDROCHLORIDE 1 G: 1 INJECTION, POWDER, LYOPHILIZED, FOR SOLUTION INTRAVENOUS at 15:51

## 2017-02-13 RX ADMIN — PROPOFOL 50 MCG/KG/MIN: 10 INJECTION, EMULSION INTRAVENOUS at 13:38

## 2017-02-13 RX ADMIN — SODIUM CHLORIDE 125 ML/HR: 900 INJECTION, SOLUTION INTRAVENOUS at 18:58

## 2017-02-13 RX ADMIN — SODIUM CHLORIDE: 900 IRRIGANT IRRIGATION at 14:47

## 2017-02-13 RX ADMIN — METOPROLOL SUCCINATE 25 MG: 25 TABLET, FILM COATED, EXTENDED RELEASE ORAL at 21:30

## 2017-02-13 RX ADMIN — MIDAZOLAM HYDROCHLORIDE 2.5 MG: 1 INJECTION, SOLUTION INTRAMUSCULAR; INTRAVENOUS at 13:28

## 2017-02-13 RX ADMIN — HYDROMORPHONE HYDROCHLORIDE 0.5 MG: 2 INJECTION, SOLUTION INTRAMUSCULAR; INTRAVENOUS; SUBCUTANEOUS at 15:59

## 2017-02-13 RX ADMIN — SODIUM CHLORIDE, SODIUM LACTATE, POTASSIUM CHLORIDE, AND CALCIUM CHLORIDE: 600; 310; 30; 20 INJECTION, SOLUTION INTRAVENOUS at 14:15

## 2017-02-13 RX ADMIN — SUFENTANIL CITRATE 5 MCG: 50 INJECTION EPIDURAL; INTRAVENOUS at 14:40

## 2017-02-13 RX ADMIN — VANCOMYCIN HYDROCHLORIDE 1000 MG: 1 INJECTION, POWDER, LYOPHILIZED, FOR SOLUTION INTRAVENOUS at 21:30

## 2017-02-13 RX ADMIN — FENTANYL CITRATE 50 MCG: 50 INJECTION, SOLUTION INTRAMUSCULAR; INTRAVENOUS at 14:30

## 2017-02-13 RX ADMIN — FENTANYL CITRATE 50 MCG: 50 INJECTION, SOLUTION INTRAMUSCULAR; INTRAVENOUS at 13:53

## 2017-02-13 RX ADMIN — LIDOCAINE HYDROCHLORIDE 60 MG: 20 INJECTION, SOLUTION EPIDURAL; INFILTRATION; INTRACAUDAL; PERINEURAL at 13:34

## 2017-02-13 RX ADMIN — FENTANYL CITRATE 50 MCG: 50 INJECTION, SOLUTION INTRAMUSCULAR; INTRAVENOUS at 13:28

## 2017-02-13 RX ADMIN — ONDANSETRON 4 MG: 2 INJECTION INTRAMUSCULAR; INTRAVENOUS at 15:45

## 2017-02-13 RX ADMIN — SUCCINYLCHOLINE CHLORIDE 100 MG: 20 INJECTION INTRAMUSCULAR; INTRAVENOUS at 13:34

## 2017-02-13 NOTE — ANESTHESIA POSTPROCEDURE EVALUATION
Post-Anesthesia Evaluation and Assessment    Patient: Ovidio Severance MRN: [id number]  SSN: [id number]    YOB: 1960  Age: 49 y.o. Sex: female       Cardiovascular Function/Vital Signs  Visit Vitals    /79    Pulse 94    Temp 36.7 °C (98.1 °F)    Resp 10    Ht 5' 6\" (1.676 m)    Wt 77 kg (169 lb 12.1 oz)    SpO2 94%    BMI 27.4 kg/m2       Patient is status post general anesthesia for Procedure(s):  POSTERIOR C7-T1 FORAMINOTOMY AND FUSION, C6-T1 WITH LATERAL MASS SCREWS. Nausea/Vomiting: None    Postoperative hydration reviewed and adequate. Pain:  Pain Scale 1: Numeric (0 - 10) (02/13/17 1720)  Pain Intensity 1: 0 (02/13/17 1720)   Managed    Neurological Status:   Neuro (WDL): Within Defined Limits ( and plantar flexion equal and strong) (02/13/17 1720)  Neuro  LUE Motor Response: Purposeful (02/13/17 1720)  LLE Motor Response: Purposeful (02/13/17 1720)  RUE Motor Response: Purposeful (02/13/17 1720)  RLE Motor Response: Purposeful (02/13/17 1720)   At baseline    Mental Status and Level of Consciousness: Arousable    Pulmonary Status:   O2 Device: Nasal cannula (02/13/17 1720)   Adequate oxygenation and airway patent    Complications related to anesthesia: None    Post-anesthesia assessment completed.  No concerns    Signed By: Zoltan Carter DO     February 13, 2017

## 2017-02-13 NOTE — PERIOP NOTES
Pt fall protocol  Yellow arm band on patient, yellow non skid socks on   bed in low position, all side rails up, call bell in reach  Pt  & family instructed in \"call don't fall\" protocol     Use your call bell,and wait for assistance, staff not family will assist you to get up &   move about  Pt & family verbalize understanding of fall precautions and \"call don't fall\" protocol]    0945 - pt made aware of +MRSA nasal swab and precautions taken per staff and family members. Verbalized understanding, questions/concerns denied. Pt aware that will receive abx therapy for such.

## 2017-02-13 NOTE — IP AVS SNAPSHOT
[address] 
[phone] Patient: Dell Mccray MRN: [id number] NFA:[id number] You are allergic to the following Allergen Reactions Ceclor (Cefaclor) Anaphylaxis Pcn (Penicillins) Anaphylaxis Hives Blistering hives causing skin peeling required skin grafts Sulfa (Sulfonamide Antibiotics) Anaphylaxis Aspirin Nausea Only Codeine Nausea and Vomiting Erythromycin Itching Nsaids (Non-Steroidal Anti-Inflammatory Drug) Nausea Only Recent Documentation Height Weight Breastfeeding? BMI OB Status Smoking Status 1.676 m 77 kg No 27.4 kg/m2 Hysterectomy Current Every Day Smoker Emergency Contacts Name Discharge Info Relation Home Work Mobile [facility] CAREGIVER [3] Sister [23]   [phone] [employer]  Life Partner [7]   [phone] About your hospitalization You were admitted on:  February 13, 2017 You last received care in the:  [facility] 4M POST SURG ORT 1 You were discharged on:  February 15, 2017 Unit phone number:  [phone] Why you were hospitalized Your primary diagnosis was:  Not on File Your diagnoses also included:  Cervical Stenosis Of Spine, Pseudoarthrosis Of Cervical Spine (Hcc), Foraminal Stenosis Of Cervical Region Providers Seen During Your Hospitalizations Provider Role Specialty Primary office phone Arelis Curry MD Attending Provider Orthopedic Surgery [phone] Your Primary Care Physician (PCP) Primary Care Physician Office Phone Office Fax Sonya Wilsonle [phone] Follow-up Information Follow up With Details Comments Contact Info Kelly Carvajal MD   [address] [place] [address] 
[phone] Current Discharge Medication List  
  
START taking these medications Dose & Instructions Dispensing Information Comments Morning Noon Evening Bedtime Calcium Citrate-Vitamin D3 500 mg calcium -400 unit Chew Your next dose is: Today, Tomorrow Other:  _________ Dose:  500 mg Take 500 mg by mouth two (2) times a day. Indications: PREVENTION OF VITAMIN D DEFICIENCY, enhances fusion Quantity:  60 Tab Refills:  2  
     
   
   
   
  
 diazePAM 5 mg tablet Commonly known as:  VALIUM Your next dose is: Today, Tomorrow Other:  _________ Dose:  5 mg Take 1 Tab by mouth every eight (8) hours as needed (Muscle Spasms). Max Daily Amount: 15 mg. Indications: MUSCLE SPASM Quantity:  40 Tab Refills:  0 HYDROmorphone 4 mg tablet Commonly known as:  DILAUDID Your next dose is: Today, Tomorrow Other:  _________ Dose:  6-8 mg Take 1.5-2 Tabs by mouth every three (3) hours as needed for Pain. Max Daily Amount: 64 mg. Quantity:  120 Tab Refills:  0  
     
   
   
   
  
 senna-docusate 8.6-50 mg per tablet Commonly known as:  Gissel Holms Your next dose is: Today, Tomorrow Other:  _________ Dose:  1 Tab Take 1 Tab by mouth two (2) times a day. Quantity:  60 Tab Refills:  1 CONTINUE these medications which have NOT CHANGED Dose & Instructions Dispensing Information Comments Morning Noon Evening Bedtime  
 fexofenadine 180 mg tablet Commonly known as:  Neeta Cazares Your next dose is: Today, Tomorrow Other:  _________ Dose:  180 mg Take 180 mg by mouth daily as needed for Allergies. Indications: URTICARIA Refills:  0  
     
   
   
   
  
 metoprolol succinate 25 mg XL tablet Commonly known as:  TOPROL-XL Your next dose is: Today, Tomorrow Other:  _________ Dose:  25 mg Take 25 mg by mouth nightly. Refills:  0 ONE-A-DAY WOMENS FORMULA PO Your next dose is: Today, Tomorrow Other:  _________ Dose:  1 Gum Take 1 Gum by mouth daily. Refills:  0 PriLOSEC OTC 20 mg tablet Generic drug:  omeprazole Your next dose is: Today, Tomorrow Other:  _________ Dose:  20 mg Take 20 mg by mouth daily as needed. Refills:  0 Venlafaxine 150 mg Tr24 Your next dose is: Today, Tomorrow Other:  _________ Dose:  1 Tab Take 1 Tab by mouth daily. Refills:  0 STOP taking these medications HYDROcodone-acetaminophen 7.5-325 mg per tablet Commonly known as:  Basilio Ornelas Where to Get Your Medications Information on where to get these meds will be given to you by the nurse or doctor. ! Ask your nurse or doctor about these medications Calcium Citrate-Vitamin D3 500 mg calcium -400 unit Chew  
 diazePAM 5 mg tablet HYDROmorphone 4 mg tablet  
 senna-docusate 8.6-50 mg per tablet Discharge Instructions Major Weathers M.D. [address] [facility] Office Phone: [phone] Neck Surgery Discharge Instructions Activities ? You are going home a well person, be as active as possible. Your only exercise should be walking. Start with short frequent walks and increase your walking distance each day. Start with walking twice a day for 5 minutes and increase your distance each day 2-3 minutes until you reach 25 minutes twice a day. Limit the amount of time you sit to 20-30 minute intervals. Sitting for prolonged periods of time will be uncomfortable for you following your surgery. ? Do not lift anything over five pounds, and do not do any bending or straining. ? Avoid reaching overhead for 2-3 weeks ? Do not do any neck exercises until you have been instructed by your doctor. ? When you are in the bed, you may lay on your back or on either side. Do not lay on your stomach. ? Continue using your incentive spirometer regularly for deep breathing exercises ? You may resume sexual relations 2 weeks after your surgery Diet ? You may resume your normal diet. If your throat is sore, you may want to eat soft foods for a few days. Be sure to drink plenty of fluids, it is important to keep yourself hydrated. If you begin having trouble swallowing, call the office immediately. ? Avoid alcoholic beverages and ABSOLUTELY NO tobacco products. Tobacco products will interfere with your healing. If you continue to use tobacco, you may end up needing another surgery in the future. Medications ? Do not take anti-inflammatory medications or aspirin unless instructed by your physician. ? Take your pain medication as directed. ? Do NOT take additional Tylenol if your prescribed pain medication has acetaminophen in it (Endocet/Percocet, Lortab, Norco). ? It is important to have regular bowel movements. Pain medications may cause constipation. Stool softeners, prune juice, and increasing your water and fiber intake may help in preventing constipation. ? Do NOT take laxatives if at all possible except in severe situations. It can results in a vicious cycle of constipation and diarrhea. ? Do not be alarmed if you still have some of the same symptoms you had prior to surgery. The nerves often require time to heal after the pressure has been relieved. You may experience pain in your shoulders or between your shoulder blades, which is common after this surgery. The level of pain you experience should improve as your body heals. Driving ? You may not drive or return to work until instructed by your physician. However, you may ride in the car for doctors appointments ONLY. Neck collar ? You are required to wear your cervical collar at all times.   You may remove the collar long enough to change the pads when needed and to change your dressing each day. ? Do not bend or twist your neck when your collar is removed. It is best to have someone assist you when changing the pads and dressing to prevent you from bending your neck. Showering ? You may shower in approximately 4 days after your surgery if your incision is not draining. ? Leave the dressing on during your shower but avoid getting the dressing wet. ? Do not use tub baths, swimming pools or Jacuzzis. ? Neck collars may need to be worn even while in the shower. If your collar is removed for showering, be sure not to turn your neck while the collar is off. Also, make sure you put dry pads on your collar after your shower. Caring for your incision ? Leave the clear plastic dressing on x 7 days after surgery. At that point, you may remove the dressing and keep the incision open to air. ? You will probably have steri-strips on your incision (small, white pieces of tape). Do not pull the steri-strips; they will fall off on their own after several days. If you have sutures or staples, they will be removed when you see your physician. ? Do not rub or apply any lotions or ointments to your incision site. Follow Up 
? Once you are home, call your physicians office to schedule an appointment for your 3 week postoperative visit. Notify your physician if you develop any of the following conditions: 
? Fever above 101 degrees for 24 hours. ? Nausea or vomiting. ? Severe headache. 
? Inability to urinate. ? Loss of bowel or bladder control (sudden onset of incontinence). ? Changes in sensation in your extremities (numbness, tingling, loss of color). ? Severe pain or pain not relieved by medications. ? Redness, swelling, or drainage from your incision. ? Persistent pain in the chest.  
? Pain in the calf of either leg. 
? Increased weakness (if this is greater than before your surgery). Discharge Orders None [employer] Announcement We are excited to announce that we are making your provider's discharge notes available to you in Fi[employer]. You will see these notes when they are completed and signed by the physician that discharged you from your recent hospital stay. If you have any questions or concerns about any information you see in Fi[employer], please call the Health Information Department where you were seen or reach out to your Primary Care Provider for more information about your plan of care. Introducing [facility]! Tiana Peñaloza introduces [employer] patient portal. Now you can access parts of your medical record, email your doctor's office, and request medication refills online. 1. In your internet browser, go to [email or web address] 2. Click on the First Time User? Click Here link in the Sign In box. You will see the New Member Sign Up page. 3. Enter your [employer] Access Code exactly as it appears below. You will not need to use this code after youve completed the sign-up process. If you do not sign up before the expiration date, you must request a new code. · [employer] Access Code: [email or web address] Expires: 4/17/2017  9:54 AM 
 
4. Enter the last four digits of your Social Security Number (xxxx) and Date of Birth (mm/dd/yyyy) as indicated and click Submit. You will be taken to the next sign-up page. 5. Create a FiF[employer] ID. This will be your [employer] login ID and cannot be changed, so think of one that is secure and easy to remember. 6. Create a [employer] password. You can change your password at any time. 7. Enter your Password Reset Question and Answer. This can be used at a later time if you forget your password. 8. Enter your e-mail address. You will receive e-mail notification when new information is available in [address] 9. Click Sign Up. You can now view and download portions of your medical record. 10. Click the Download Summary menu link to download a portable copy of your medical information. If you have questions, please visit the Frequently Asked Questions section of the [employer] website. Remember, [employer] is NOT to be used for urgent needs. For medical emergencies, dial 911. Now available from your iPhone and Android! General Information Please provide this summary of care documentation to your next provider. Patient Signature:  ____________________________________________________________ Date:  ____________________________________________________________  
  
Damir Sport Provider Signature:  ____________________________________________________________ Date:  ____________________________________________________________

## 2017-02-13 NOTE — PERIOP NOTES
TRANSFER - OUT REPORT:    Verbal report given to April rn(name) on Donnell Vargas  being transferred to [facility](unit) for routine post - op       Report consisted of patients Situation, Background, Assessment and   Recommendations(SBAR). Information from the following report(s) SBAR, Procedure Summary, Intake/Output and MAR was reviewed with the receiving nurse. Lines:   Peripheral IV 02/13/17 Left Hand (Active)   Site Assessment Clean, dry, & intact 2/13/2017  4:33 PM   Phlebitis Assessment 0 2/13/2017  4:33 PM   Infiltration Assessment 0 2/13/2017  4:33 PM   Dressing Status Clean, dry, & intact 2/13/2017  4:33 PM   Dressing Type Tape 2/13/2017  4:33 PM   Hub Color/Line Status Pink 2/13/2017  4:33 PM   Alcohol Cap Used Yes 2/13/2017 10:02 AM       Peripheral IV Right Wrist (Active)   Site Assessment Clean, dry, & intact 2/13/2017  4:33 PM   Phlebitis Assessment 0 2/13/2017  4:33 PM   Infiltration Assessment 0 2/13/2017  4:33 PM   Dressing Status Clean, dry, & intact 2/13/2017  4:33 PM   Dressing Type Tape;Transparent 2/13/2017  4:33 PM   Hub Color/Line Status Pink 2/13/2017  4:33 PM        Opportunity for questions and clarification was provided.       Patient transported with:   O2 @ 2 liters  Registered Nurse

## 2017-02-13 NOTE — H&P
Date of Surgery Update:  Owen Kelly was seen and examined. History and physical has been reviewed. The patient has been examined. There have been no significant clinical changes since the completion of the originally dated History and Physical.  Patient identified by surgeon; surgical site was confirmed by patient and surgeon.     Signed By: Telma Junior MD     February 13, 2017 12:42 PM

## 2017-02-13 NOTE — IP AVS SNAPSHOT
Current Discharge Medication List  
  
Take these medications at their scheduled times Dose & Instructions Dispensing Information Comments Morning Noon Evening Bedtime Calcium Citrate-Vitamin D3 500 mg calcium -400 unit Chew Your next dose is: Today, Tomorrow Other:  ____________ Dose:  500 mg Take 500 mg by mouth two (2) times a day. Indications: PREVENTION OF VITAMIN D DEFICIENCY, enhances fusion Quantity:  60 Tab Refills:  2  
     
   
   
   
  
 metoprolol succinate 25 mg XL tablet Commonly known as:  TOPROL-XL Your next dose is: Today, Tomorrow Other:  ____________ Dose:  25 mg Take 25 mg by mouth nightly. Refills:  0  
     
   
   
   
  
 ONE-A-DAY WOMENS FORMULA PO Your next dose is: Today, Tomorrow Other:  ____________ Dose:  1 Gum Take 1 Gum by mouth daily. Refills:  0  
     
   
   
   
  
 senna-docusate 8.6-50 mg per tablet Commonly known as:  Rose Marie Cleaves Your next dose is: Today, Tomorrow Other:  ____________ Dose:  1 Tab Take 1 Tab by mouth two (2) times a day. Quantity:  60 Tab Refills:  1 Venlafaxine 150 mg Tr24 Your next dose is: Today, Tomorrow Other:  ____________ Dose:  1 Tab Take 1 Tab by mouth daily. Refills:  0 Take these medications as needed Dose & Instructions Dispensing Information Comments Morning Noon Evening Bedtime  
 diazePAM 5 mg tablet Commonly known as:  VALIUM Your next dose is: Today, Tomorrow Other:  ____________ Dose:  5 mg Take 1 Tab by mouth every eight (8) hours as needed (Muscle Spasms). Max Daily Amount: 15 mg. Indications: MUSCLE SPASM Quantity:  40 Tab Refills:  0  
     
   
   
   
  
 fexofenadine 180 mg tablet Commonly known as:  Tiffanie Chock Your next dose is: Today, Tomorrow Other:  ____________ Dose:  180 mg Take 180 mg by mouth daily as needed for Allergies. Indications: URTICARIA Refills:  0 HYDROmorphone 4 mg tablet Commonly known as:  DILAUDID Your next dose is: Today, Tomorrow Other:  ____________ Dose:  6-8 mg Take 1.5-2 Tabs by mouth every three (3) hours as needed for Pain. Max Daily Amount: 64 mg. Quantity:  120 Tab Refills:  0 PriLOSEC OTC 20 mg tablet Generic drug:  omeprazole Your next dose is: Today, Tomorrow Other:  ____________ Dose:  20 mg Take 20 mg by mouth daily as needed. Refills:  0 Where to Get Your Medications Information about where to get these medications is not yet available ! Ask your nurse or doctor about these medications Calcium Citrate-Vitamin D3 500 mg calcium -400 unit Chew  
 diazePAM 5 mg tablet HYDROmorphone 4 mg tablet  
 senna-docusate 8.6-50 mg per tablet

## 2017-02-13 NOTE — ANESTHESIA PREPROCEDURE EVALUATION
Anesthetic History   No history of anesthetic complications            Review of Systems / Medical History  Patient summary reviewed, nursing notes reviewed and pertinent labs reviewed    Pulmonary  Within defined limits                 Neuro/Psych         Psychiatric history     Cardiovascular            Dysrhythmias       Exercise tolerance: >4 METS  Comments: metoprolol succinate (TOPROL-XL) 25 mg XL tablet   2/12/2017 at 2100    Was told by PCP that her heard some irregularity. Was put on toprol and no problems since.     She was not referred to Card or given a blood thinner     GI/Hepatic/Renal     GERD           Endo/Other        Arthritis     Other Findings   Comments: TMJ surgery- 30 years ago again 1993         Physical Exam    Airway  Mallampati: II  TM Distance: 4 - 6 cm  Neck ROM: normal range of motion   Mouth opening: Normal     Cardiovascular  Regular rate and rhythm,  S1 and S2 normal,  no murmur, click, rub, or gallop  Rhythm: regular  Rate: normal         Dental    Dentition: Full upper dentures     Pulmonary  Breath sounds clear to auscultation               Abdominal  GI exam deferred       Other Findings            Anesthetic Plan    ASA: 3  Anesthesia type: general          Induction: Intravenous  Anesthetic plan and risks discussed with: Patient

## 2017-02-14 PROCEDURE — 77010033678 HC OXYGEN DAILY

## 2017-02-14 PROCEDURE — 74011250637 HC RX REV CODE- 250/637: Performed by: PHYSICIAN ASSISTANT

## 2017-02-14 PROCEDURE — 65270000029 HC RM PRIVATE

## 2017-02-14 PROCEDURE — 74011250637 HC RX REV CODE- 250/637: Performed by: NURSE PRACTITIONER

## 2017-02-14 PROCEDURE — 77030012890

## 2017-02-14 PROCEDURE — 74011250636 HC RX REV CODE- 250/636: Performed by: PHYSICIAN ASSISTANT

## 2017-02-14 PROCEDURE — 74011250637 HC RX REV CODE- 250/637: Performed by: ORTHOPAEDIC SURGERY

## 2017-02-14 PROCEDURE — 97530 THERAPEUTIC ACTIVITIES: CPT

## 2017-02-14 PROCEDURE — 97163 PT EVAL HIGH COMPLEX 45 MIN: CPT

## 2017-02-14 RX ORDER — HYDROMORPHONE HYDROCHLORIDE 2 MG/1
4 TABLET ORAL
Status: DISCONTINUED | OUTPATIENT
Start: 2017-02-14 | End: 2017-02-15

## 2017-02-14 RX ORDER — OXYCODONE HYDROCHLORIDE 5 MG/1
15 TABLET ORAL
Status: DISCONTINUED | OUTPATIENT
Start: 2017-02-14 | End: 2017-02-14

## 2017-02-14 RX ORDER — HYDROMORPHONE HYDROCHLORIDE 2 MG/1
4 TABLET ORAL
Status: DISCONTINUED | OUTPATIENT
Start: 2017-02-14 | End: 2017-02-14

## 2017-02-14 RX ORDER — OXYCODONE HYDROCHLORIDE 5 MG/1
20 TABLET ORAL
Status: DISCONTINUED | OUTPATIENT
Start: 2017-02-14 | End: 2017-02-14

## 2017-02-14 RX ORDER — HYDROMORPHONE HYDROCHLORIDE 2 MG/1
2 TABLET ORAL
Status: DISCONTINUED | OUTPATIENT
Start: 2017-02-14 | End: 2017-02-14

## 2017-02-14 RX ORDER — DIAZEPAM 5 MG/1
10 TABLET ORAL
Status: DISCONTINUED | OUTPATIENT
Start: 2017-02-14 | End: 2017-02-15 | Stop reason: HOSPADM

## 2017-02-14 RX ORDER — HYDROMORPHONE HYDROCHLORIDE 2 MG/1
6 TABLET ORAL
Status: DISCONTINUED | OUTPATIENT
Start: 2017-02-14 | End: 2017-02-15

## 2017-02-14 RX ADMIN — ACETAMINOPHEN 650 MG: 325 TABLET ORAL at 23:33

## 2017-02-14 RX ADMIN — HYDROMORPHONE HYDROCHLORIDE 0.5 MG: 1 INJECTION, SOLUTION INTRAMUSCULAR; INTRAVENOUS; SUBCUTANEOUS at 01:09

## 2017-02-14 RX ADMIN — Medication 1 TABLET: at 17:22

## 2017-02-14 RX ADMIN — ACETAMINOPHEN 650 MG: 325 TABLET ORAL at 17:22

## 2017-02-14 RX ADMIN — Medication 10 ML: at 16:35

## 2017-02-14 RX ADMIN — VENLAFAXINE HYDROCHLORIDE 150 MG: 150 CAPSULE, EXTENDED RELEASE ORAL at 10:36

## 2017-02-14 RX ADMIN — VANCOMYCIN HYDROCHLORIDE 1000 MG: 1 INJECTION, POWDER, LYOPHILIZED, FOR SOLUTION INTRAVENOUS at 10:35

## 2017-02-14 RX ADMIN — METOPROLOL SUCCINATE 25 MG: 25 TABLET, FILM COATED, EXTENDED RELEASE ORAL at 20:38

## 2017-02-14 RX ADMIN — Medication 10 ML: at 05:47

## 2017-02-14 RX ADMIN — ACETAMINOPHEN 650 MG: 325 TABLET ORAL at 11:57

## 2017-02-14 RX ADMIN — ACETAMINOPHEN 650 MG: 325 TABLET ORAL at 05:47

## 2017-02-14 RX ADMIN — POLYETHYLENE GLYCOL 3350 17 G: 17 POWDER, FOR SOLUTION ORAL at 10:36

## 2017-02-14 RX ADMIN — OXYCODONE HYDROCHLORIDE 20 MG: 5 TABLET ORAL at 08:33

## 2017-02-14 RX ADMIN — HYDROMORPHONE HYDROCHLORIDE 4 MG: 2 TABLET ORAL at 11:57

## 2017-02-14 RX ADMIN — Medication 10 ML: at 20:22

## 2017-02-14 RX ADMIN — HYDROMORPHONE HYDROCHLORIDE 0.5 MG: 1 INJECTION, SOLUTION INTRAMUSCULAR; INTRAVENOUS; SUBCUTANEOUS at 20:22

## 2017-02-14 RX ADMIN — ACETAMINOPHEN 650 MG: 325 TABLET ORAL at 01:08

## 2017-02-14 RX ADMIN — HYDROMORPHONE HYDROCHLORIDE 4 MG: 2 TABLET ORAL at 16:35

## 2017-02-14 RX ADMIN — OXYCODONE HYDROCHLORIDE 10 MG: 5 TABLET ORAL at 05:47

## 2017-02-14 RX ADMIN — Medication 1 TABLET: at 10:36

## 2017-02-14 RX ADMIN — DIAZEPAM 5 MG: 5 TABLET ORAL at 10:36

## 2017-02-14 RX ADMIN — DIAZEPAM 10 MG: 5 TABLET ORAL at 20:38

## 2017-02-14 RX ADMIN — DIAZEPAM 10 MG: 5 TABLET ORAL at 14:38

## 2017-02-14 RX ADMIN — SODIUM CHLORIDE 125 ML/HR: 900 INJECTION, SOLUTION INTRAVENOUS at 01:09

## 2017-02-14 RX ADMIN — HYDROMORPHONE HYDROCHLORIDE 6 MG: 2 TABLET ORAL at 23:33

## 2017-02-14 NOTE — PROGRESS NOTES
Ortho Spine Daily Progress Note    Date of Surgery:  2017      Patient: Noemi Perez   YOB: 1960  Age: 49 y.o. SUBJECTIVE:   1 Day Post-Op following POSTERIOR C7-T1 FORAMINOTOMY AND FUSION, C6-T1 WITH LATERAL MASS SCREWS    The patient C/O severe post operative neck pain, pain = 8-9/10. She is swallowing liquids without difficulty. She denies CP, SOB, N/V/D, dizziness, abdominal pain, HA. +flatus, - BM. OBJECTIVE:     Vital Signs:    Temp (24hrs), Av.1 °F (36.7 °C), Min:97.4 °F (36.3 °C), Max:98.8 °F (37.1 °C)    Patient Vitals for the past 8 hrs:   BP Temp Pulse Resp SpO2   17 0320 111/72 98.8 °F (37.1 °C) 77 16 98 %           Physical Exam:  General: A&Ox3, NAD. Respiratory: Respirations are unlabored. Abdomen: S/NT  Surgical site: C,D and I  Musculoskeletal: Calves are S/NT, Benjy UE's /intrinsics/wrist extension intact  Neurological:  Benjy UE's/LE's NVI    Laboratory Values:             No results for input(s): HGB, PLT, INR, CREA, GLU, HGBEXT, PLTEXT in the last 72 hours. No lab exists for component: INREXT  Lab Results   Component Value Date/Time    Sodium 140 2017 11:13 AM    Potassium 4.8 2017 11:13 AM    Chloride 101 2017 11:13 AM    CO2 32 2017 11:13 AM    Glucose 85 2017 11:13 AM    BUN 8 2017 11:13 AM    Creatinine 0.85 2017 11:13 AM    Calcium 9.0 2017 11:13 AM       PLAN:     S/P POSTERIOR C7-T1 FORAMINOTOMY AND FUSION, C6-T1 WITH LATERAL MASS SCREWS Mobilize and continue with PT BID until discharged. Hemodynamics Stable     Wound Continue dressing changes PRN. Post Operative Pain Pain Control: Will increase pain meds for better pain control. DVT Prophylaxis Continue with SCD'S, Ankle Pump Exercises, Mobilize. Discharge Disposition Discharge plan: Possible D/C home today if able to meet goals for discharge.          Patient seen and evaluated by Dr Enoc Bahena who agrees with the findings and treatment plan as outlined above.       Signed By: Khanh Alvarez PA-C  February 14, 2017 7:47 AM

## 2017-02-14 NOTE — INTERDISCIPLINARY ROUNDS
[address]    Patient Information    Name: Camila Mcfarlane  Age: 49 y.o. Admission Date: 2/13/2017  Surgery/Procedure: Procedure(s):  POSTERIOR C7-T1 FORAMINOTOMY AND FUSION, C6-T1 WITH LATERAL MASS SCREWS  Attending Provider: Robert Mattson MD  Surgeon: Nicolasa Fermin   Problem List: Active Problems:    Cervical stenosis of spine (2/13/2017)      Pseudoarthrosis of cervical spine ([address] (2/13/2017)      Foraminal stenosis of cervical region (2/13/2017)      During rounds the following quality care indicators and evidence based practices were addressed :       PT/OT: Patient mobility - Not seen yet                       Pain Assessment  Pain Intensity 1: 8 (02/14/17 1038)  Pain Location 1: Neck  Pain Intervention(s) 1: Medication (see MAR)  Patient Stated Pain Goal: [address]: need TBD  Rehab/SNF Facility:     Pain meds: oxycodone  Antibiotics completed: Yes, Vanc  Anticoagulation:  none    SCDs: in place  Ty: N   Bowels:     Goals For Today: Progress with PT, pain control    RRAT Score:      Readmit Risk Tool  Support Systems: Family member(s)  Relationship with Primary Physician Group: Seen at least one time within the past 6 months    Discharge Management/Planning:    Readmit Risk Assessment Information:   Low Risk            12       Total Score        3 Relationship with PCP    4 More than 1 Admission in calendar year    5 Patient Insurance is Medicare, Medicaid or Self Pay        Criteria that do not apply:    Patient Living Status    Patient Length of Stay > 5    Charlson Comorbidity Score         Readmit Risk Tool  Support Systems: Family member(s)  Relationship with Primary Physician Group: Seen at least one time within the past 6 months    Anticipated Date of Discharge: today/tomorrow    Interdisciplinary team rounds were held with the following team members: Nurse Practitioner, Case Management, Nursing, Pharmacy, and Rehab.  See clinical pathway and/or care plan for interventions and desired outcomes.
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
## 2017-02-14 NOTE — OP NOTES
Sean Dealelsen [facility] [address]   [address] [facility], [address]   OP NOTE       Name:  Jesus Alberto Duenas   MR#:  [id number]   :  1960   Account #:  [de-identified]    Surgery Date:  2017   Date of Adm:  2017       PREOPERATIVE DIAGNOSES   1. Previous fusion, C6-C7, with a pseudoarthrosis. 2. Left foraminal stenosis at C7-T1. POSTOPERATIVE DIAGNOSES   1. Previous fusion, C6-C7, with a pseudoarthrosis. 2. Left foraminal stenosis at C7-T1. PROCEDURES PERFORMED: Posterior foraminotomy on the left at   C7-T1; followed by lateral mass instrumentation and pedicle screw at   T1 and a fusion using available bone and Map3 with 10 mL; Nelson   tong placement and removal.    COMPLICATIONS: None. ESTIMATED BLOOD LOSS: Minimal.    SPECIMENS REMOVED: 0    ANESTHESIA: General endotracheal.    DRAINS: José Miguel-Sánchez 7-mm flat. INSTRUMENTATION: RTI lateral mass and pedicle screws. SURGEON: Roman Durán. Javier Shipley MD    ASSISTANT: Leo Calderon. COMPLICATIONS: None. PREOPERATIVE ANTIBIOTICS: Ancef 2 grams. Intraoperative, we   sprinkled about 800 mg of vancomycin in the wound. INDICATIONS FOR PROCEDURE: Severe neck pain with left   shoulder and arm symptoms in a C8 distribution. The patient had a   previous fusion years ago without instrumentation with a chronic   pseudoarthrosis, so we included that as well. She understands the   risks and benefits and wanted to proceed. DESCRIPTION OF PROCEDURE: The patient was taken to the   operating room, placed under general endotracheal anesthesia. The   posterior neck was prepped and draped in usual sterile manner after   placing Nelson tongs, and putting her in the prone position and   checking the appropriately, and we make our incision over the C6, C7,   T1 area, take lateral x-rays to confirm.  Sharp dissection through the   skin and Bovie electrocautery down along the spinous processes over   the lamina and lateral mass of the facet joints. Once we had the whole   spine exposed, we stopped the bleeding with Bovie electrocautery and   thrombin Gelfoam. We then do a keyhole laminotomy on the left at C7-  T1 and then used a Kerrison rongeur to open up the neural foramen   and palpate it with a nerve hook and found it to be open. We then,   under fluoroscopic guidance, placed holes in the pedicle at T1   bilaterally using AP fluoroscopy, drilling and then using an awl to   extend it, testing to see if we are rubbing up against a nerve root and   both sides tests to 20+ milliamps. We then placed 4 mm screws down   into the pedicle bilaterally, 20 mm in length. Then using landmarks, we   drilled the lateral masses of C7 and T1 in a 30-degree cephalad, 30-  degree oblique fashion. We did not get outside of the lateral mass at   all. We palpate them with a ball-tip probe and EMG testing and find   them acceptable. Then we placed 14 mm screws in the 4 holes and we   tested the screws and they were between 13 and 20 milliamps. We   then connect the screw heads with 30 mm rods and burred the facet   joints and the lamina, and then packed it with the bone removed from   the laminotomy as well as the Map3 with 10 mL of cancellous chips   with the stem cells. We then placed our drain and then closed in   multiple layers, closing the deep fascia with 0 interrupted figure-of-  eight Vicryl, and 0 and 2-0 in the subcutaneous and staples in the skin. We then removed the Nelson tongs and sent the patient to the   recovery area in a Vista collar without any complications.         MD ELDER Vidales / CODY   D:  02/13/2017   16:10   T:  02/13/2017   23:53   Job #:  [id number]

## 2017-02-14 NOTE — PROGRESS NOTES
2/14/2017 8:36 AM Met with pt. Charted address and phone number confirmed. Pt will have her girlfriend at home after discharge to assist. Pt has had [facility] in the past through [facility] and would like to use this agency again if [facility] is needed at discharge. Pt owns a RW and cane. Pt has rx coverage and fills her scripts at [facility]. No discharge needs identified at this time. CM will follow. CHINEDU Berman  Care Management Interventions  PCP Verified by CM: Yes Brent Chacon )  Mode of Transport at Discharge: Other (see comment) (Pt's girlfriend )  Physical Therapy Consult: Yes  Current Support Network:  Other  Confirm Follow Up Transport: Family

## 2017-02-14 NOTE — PROGRESS NOTES
Problem: Complex Spine Procedure: Post Op Day 1  Goal: Activity/Safety  Outcome: Progressing Towards Goal  Up 1 assist to chair or BSC  Goal: Nutrition/Diet  Outcome: Resolved/Met Date Met:  02/14/17  Tolerating but preferring to stay on liquids  Goal: Medications  Outcome: Progressing Towards Goal  Patient now with PO dilaudid and PO valium ordered for pain management. Will attempt a couple of doses for patient per recommendations of NP Angy Underwood. Goal: Respiratory  Outcome: Resolved/Met Date Met:  02/14/17  RA. Offered IS but family member bringing patients from home.    Goal: Treatments/Interventions/Procedures  Outcome: Resolved/Met Date Met:  02/14/17  Bilateral TEDs and SCDs

## 2017-02-14 NOTE — PROGRESS NOTES
Bedside and Verbal shift change report given to Livan Miranda RN (oncoming nurse) by Saumya Inman RN (offgoing nurse). Report included the following information SBAR, Kardex, Procedure Summary, Intake/Output, MAR, Accordion and Recent Results.

## 2017-02-14 NOTE — PROGRESS NOTES
Bedside and Verbal shift change report given to Sharon Hutton RN (oncoming nurse) by Larisa Schmitt RN (offgoing nurse). Report included the following information SBAR, Kardex, Intake/Output, MAR and Recent Results.

## 2017-02-15 VITALS
BODY MASS INDEX: 27.28 KG/M2 | OXYGEN SATURATION: 94 % | TEMPERATURE: 98.4 F | SYSTOLIC BLOOD PRESSURE: 120 MMHG | HEART RATE: 74 BPM | HEIGHT: 66 IN | RESPIRATION RATE: 16 BRPM | DIASTOLIC BLOOD PRESSURE: 72 MMHG | WEIGHT: 169.75 LBS

## 2017-02-15 PROCEDURE — 74011250636 HC RX REV CODE- 250/636: Performed by: PHYSICIAN ASSISTANT

## 2017-02-15 PROCEDURE — 77030018846 HC SOL IRR STRL H20 ICUM -A

## 2017-02-15 PROCEDURE — 74011250637 HC RX REV CODE- 250/637: Performed by: ORTHOPAEDIC SURGERY

## 2017-02-15 PROCEDURE — 77030012429

## 2017-02-15 PROCEDURE — 97116 GAIT TRAINING THERAPY: CPT

## 2017-02-15 PROCEDURE — 74011250637 HC RX REV CODE- 250/637: Performed by: PHYSICIAN ASSISTANT

## 2017-02-15 PROCEDURE — 97530 THERAPEUTIC ACTIVITIES: CPT

## 2017-02-15 PROCEDURE — 74011250637 HC RX REV CODE- 250/637: Performed by: NURSE PRACTITIONER

## 2017-02-15 RX ORDER — HYDROMORPHONE HYDROCHLORIDE 4 MG/1
6-8 TABLET ORAL
Qty: 120 TAB | Refills: 0 | Status: SHIPPED | OUTPATIENT
Start: 2017-02-15

## 2017-02-15 RX ORDER — HYDROMORPHONE HYDROCHLORIDE 2 MG/1
6 TABLET ORAL
Status: DISCONTINUED | OUTPATIENT
Start: 2017-02-15 | End: 2017-02-15 | Stop reason: HOSPADM

## 2017-02-15 RX ORDER — AMOXICILLIN 250 MG
1 CAPSULE ORAL 2 TIMES DAILY
Qty: 60 TAB | Refills: 1 | Status: SHIPPED | OUTPATIENT
Start: 2017-02-15

## 2017-02-15 RX ORDER — HYDROMORPHONE HYDROCHLORIDE 2 MG/1
TABLET ORAL
Qty: 100 TAB | Refills: 0 | Status: SHIPPED | OUTPATIENT
Start: 2017-02-15 | End: 2017-02-15

## 2017-02-15 RX ORDER — HYDROMORPHONE HYDROCHLORIDE 2 MG/1
8 TABLET ORAL
Status: DISCONTINUED | OUTPATIENT
Start: 2017-02-15 | End: 2017-02-15 | Stop reason: HOSPADM

## 2017-02-15 RX ORDER — DIAZEPAM 5 MG/1
5 TABLET ORAL
Qty: 40 TAB | Refills: 0 | Status: SHIPPED | OUTPATIENT
Start: 2017-02-15

## 2017-02-15 RX ADMIN — HYDROMORPHONE HYDROCHLORIDE 0.5 MG: 1 INJECTION, SOLUTION INTRAMUSCULAR; INTRAVENOUS; SUBCUTANEOUS at 05:19

## 2017-02-15 RX ADMIN — HYDROMORPHONE HYDROCHLORIDE 6 MG: 2 TABLET ORAL at 03:07

## 2017-02-15 RX ADMIN — HYDROMORPHONE HYDROCHLORIDE 8 MG: 2 TABLET ORAL at 12:18

## 2017-02-15 RX ADMIN — Medication 1 TABLET: at 08:48

## 2017-02-15 RX ADMIN — HYDROMORPHONE HYDROCHLORIDE 0.5 MG: 1 INJECTION, SOLUTION INTRAMUSCULAR; INTRAVENOUS; SUBCUTANEOUS at 10:09

## 2017-02-15 RX ADMIN — HYDROMORPHONE HYDROCHLORIDE 0.5 MG: 1 INJECTION, SOLUTION INTRAMUSCULAR; INTRAVENOUS; SUBCUTANEOUS at 01:44

## 2017-02-15 RX ADMIN — POLYETHYLENE GLYCOL 3350 17 G: 17 POWDER, FOR SOLUTION ORAL at 08:47

## 2017-02-15 RX ADMIN — DIAZEPAM 10 MG: 5 TABLET ORAL at 08:48

## 2017-02-15 RX ADMIN — ACETAMINOPHEN 650 MG: 325 TABLET ORAL at 12:19

## 2017-02-15 RX ADMIN — DIAZEPAM 10 MG: 5 TABLET ORAL at 14:26

## 2017-02-15 RX ADMIN — Medication 10 ML: at 05:20

## 2017-02-15 RX ADMIN — VENLAFAXINE HYDROCHLORIDE 150 MG: 150 CAPSULE, EXTENDED RELEASE ORAL at 08:48

## 2017-02-15 RX ADMIN — HYDROMORPHONE HYDROCHLORIDE 8 MG: 2 TABLET ORAL at 15:59

## 2017-02-15 RX ADMIN — ACETAMINOPHEN 650 MG: 325 TABLET ORAL at 05:19

## 2017-02-15 RX ADMIN — HYDROMORPHONE HYDROCHLORIDE 6 MG: 2 TABLET ORAL at 08:48

## 2017-02-15 NOTE — PROGRESS NOTES
Bedside and Verbal shift change report given to Marion (oncoming nurse) by Elke Lomax (offgoing nurse). Report included the following information SBAR, Procedure Summary, Intake/Output, MAR and Recent Results.

## 2017-02-15 NOTE — PROGRESS NOTES
Problem: Mobility Impaired (Adult and Pediatric)  Goal: *Acute Goals and Plan of Care (Insert Text)  Physical Therapy Goals  Initiated 2/14/2017  1. Patient will move from supine to sit and sit to supine , scoot up and down and roll side to side in bed with modified independence within 7 day(s). 2. Patient will transfer from bed to chair and chair to bed with modified independence using the least restrictive device within 7 day(s). 3. Patient will perform sit <> stand with modified independence within 7 day(s). 4. Patient will ambulate with modified independence for 150 feet with the least restrictive device within 7 day(s). 5. Patient will ascend/descend 3 stairs with 1 handrail(s) with modified independence within 7 day(s). 6. Patient will be independent in recall and application of cervical spine precautions: No bending, no lifting greater than 5 lbs, no twisting, log-roll technique, repositioning every 30-45 min except when sleeping, aspen collar all times within 7 days. PHYSICAL THERAPY TREATMENT  Patient: Owen Kelly (13 y.o. female)  Date: 2/15/2017  Diagnosis: STENOSIS C7-10, PSEUDOARTHROSIS  Cervical stenosis of spine  Pseudoarthrosis of cervical spine (HCC)  Foraminal stenosis of cervical region  Pseudoarthrosis of cervical spine, sequela <principal problem not specified>  Procedure(s) (LRB):  POSTERIOR C7-T1 FORAMINOTOMY AND FUSION, C6-T1 WITH LATERAL MASS SCREWS (N/A) 2 Days Post-Op  Precautions: Back, Fall      ASSESSMENT: Note for treatment rendered ~10 am today. Patient received in long sitting bed with reports 8/10 posterior cervical pain. RN in route to bring IV pain medication. Patient guarded with pain and moves slowly but overall needs min assist to get OOB and ambulate in hallways. She ambulates with HHA. Patient recalls 3/3 spine precautions and reiterates she will have best friend with her 24/7. Patient assisted to long sitting in recliner chair with many pillows.  Patient reports chair much more comfortable than bed and patient verbalizes appreciation for new position. Patient mentioned Jesusita Ornelas may be sending me home today. \" From PT standpoint she is safe from mobility stand point with 24/7 hands on assist for functional mobility. At time of this entry patient still needing IV pain medication to supplement po meds for pain control and she still had ELIAZAR drain. Will follow while she is in house. Recommend HHPT. Patient agreed. Educated regarding fall prevention and patient verbalizes good understanding and safety awareness. Progression toward goals:  [ ]      Improving appropriately and progressing toward goals  [X]      Improving slowly and progressing toward goals  [ ]      Not making progress toward goals and plan of care will be adjusted       PLAN:  Patient continues to benefit from skilled intervention to address the above impairments. Continue treatment per established plan of care. Discharge Recommendations:  Home Health  Further Equipment Recommendations for Discharge:  none       SUBJECTIVE:   Patient stated I am so much more comfortable here (recliner chair).    The patient stated 3/3 back precautions. Reviewed all 3 with patient.       OBJECTIVE DATA SUMMARY:   Critical Behavior:  Neurologic State: Alert, Appropriate for age  Orientation Level: Appropriate for age  Cognition: Appropriate decision making, Appropriate for age attention/concentration, Appropriate safety awareness, Follows commands     Functional Mobility Training:  Bed Mobility:  Log    Supine to Sit: Minimum assistance     Scooting: Stand-by asssistance        Aspen cervical collar in place  Transfers:  Sit to Stand: Contact guard assistance  Stand to Sit: Contact guard assistance  Stand Pivot Transfers: Contact guard assistance     Bed to Chair: Contact guard assistance                    Balance: fair dynamic standing balance, requires constant HH support     Ambulation/Gait Training:  Distance (ft): 125 Feet (ft)  Assistive Device: Brace/Splint;Gait belt (aspen collar)  Ambulation - Level of Assistance: Minimal assistance (hand held)  Gait Abnormalities: Antalgic  Base of Support: Narrowed     Speed/Vangie: Shuffled     Pain:  Pain Scale 1: Numeric (0 - 10)  Pain Intensity 1: 7  Pain Location 1: Neck  Pain Orientation 1: Posterior  Pain Description 1: Aching  Pain Intervention(s) 1: Medication (see MAR)  Activity Tolerance:   Fair- improved from 2/14  Please refer to the flowsheet for vital signs taken during this treatment.   After treatment:   [X]  Patient left in no apparent distress sitting up in chair  [ ]  Patient left in no apparent distress in bed  [X]  Call bell left within reach and educated up with assist only- fall prevention  [X]  Nursing notified  [ ]  Caregiver present  [ ]  Bed alarm activated      COMMUNICATION/COLLABORATION:   The patients plan of care was discussed with: Registered Nurse     Bogdan Arnold, PT   Time Calculation: 23 mins

## 2017-02-15 NOTE — DISCHARGE SUMMARY
[address] [place],  [address] [facility]    DISCHARGE SUMMARY     Patient: Hunter Gomez                             Medical Record Number: [id number]                : 1960  Age: 49 y.o. Admit Date: 2017  Discharge Date: 2/15/2017  Admission Diagnosis: STENOSIS C7-10, PSEUDOARTHROSIS  Cervical stenosis of spine  Pseudoarthrosis of cervical spine (HCC)  Foraminal stenosis of cervical region  Pseudoarthrosis of cervical spine, sequela  Discharge Diagnosis: STENOSIS C7-10, PSEUDOARTHROSIS  Procedures: Procedure(s):  POSTERIOR C7-T1 FORAMINOTOMY AND FUSION, C6-T1 WITH LATERAL MASS SCREWS  Surgeon: Gayle Jacques MD  Assist: Brenda Jung PA-C  Anesthesia: general  Complications: None       Hospital Course:  Hunter Gomez was admitted the same day of surgery and underwent Procedure(s):  POSTERIOR C7-T1 FORAMINOTOMY AND FUSION, C6-T1 WITH LATERAL MASS SCREWS and tolerated the procedure well. She was transferred  to the recovery room in stable condition. After a brief stay the patient was then transferred to the Joint Replacement Unit at [facility].  On postoperative day #1, the dressing was clean and dry, she was neurovascularly intact. The patient was afebrile and vital signs were stable. Calves were soft and non-tender bilaterally. On postoperative day  # 2, the patient was tolerating a regular diet and making satisfactory progress with physical therapy. Hemoglobin and INR prior to discharge were     Lab Results   Component Value Date/Time    HGB 12.5 2017 11:13 AM    INR 0.9 2017 11:13 AM       Hunter Gomez made satisfactory progress with physical therapy and was discharged to Home with Home Health in stable condition on postoperative day 2.   She was provided with routine postoperative instructions and advised to follow up in my office in 3 weeks following discharge from the hospital.  She was prescribed pain medication for post-operative analgesia. Discharge Medications:  Current Discharge Medication List      START taking these medications    Details   diazePAM (VALIUM) 5 mg tablet Take 1 Tab by mouth every eight (8) hours as needed (Muscle Spasms). Max Daily Amount: 15 mg. Indications: MUSCLE SPASM  Qty: 40 Tab, Refills: 0      HYDROmorphone (DILAUDID) 2 mg tablet Take 3 tabs by mouth every 3-6 hours as needed for severe pain  Qty: 100 Tab, Refills: 0      senna-docusate (PERICOLACE) 8.6-50 mg per tablet Take 1 Tab by mouth two (2) times a day. Qty: 60 Tab, Refills: 1      Calcium Citrate-Vitamin D3 500 mg calcium -400 unit chew Take 500 mg by mouth two (2) times a day. Indications: PREVENTION OF VITAMIN D DEFICIENCY, enhances fusion  Qty: 60 Tab, Refills: 2         CONTINUE these medications which have NOT CHANGED    Details   MULTIVITS,CA,MINERALS/IRON/FA (ONE-A-DAY WOMENS FORMULA PO) Take 1 Gum by mouth daily. Venlafaxine 150 mg tr24 Take 1 Tab by mouth daily. fexofenadine (ALLEGRA) 180 mg tablet Take 180 mg by mouth daily as needed for Allergies. Indications: URTICARIA      metoprolol succinate (TOPROL-XL) 25 mg XL tablet Take 25 mg by mouth nightly. omeprazole (PRILOSEC OTC) 20 mg tablet Take 20 mg by mouth daily as needed.          STOP taking these medications       HYDROcodone-acetaminophen (NORCO) 7.5-325 mg per tablet Comments:   Reason for Stopping:               Signed by: BRANDY Louis  2/15/2017

## 2017-02-15 NOTE — PROGRESS NOTES
Received a copy of discharge instructions and several scripts. Instructions and scripts read and explained to her.  Verbalized understanding

## 2017-02-15 NOTE — PROGRESS NOTES
Bedside and Verbal shift change report given to Chon Childers RN (oncoming nurse) by Homa Esquivel RN (offgoing nurse). Report included the following information SBAR, Kardex, Procedure Summary, Intake/Output, MAR, Accordion and Recent Results.

## 2017-02-15 NOTE — PROGRESS NOTES
Ortho Spine Daily Progress Note    Date of Surgery:  2017      Patient: Pratik Escobar   YOB: 1960  Age: 49 y.o. SUBJECTIVE:   2 Days Post-Op following POSTERIOR C7-T1 FORAMINOTOMY AND FUSION, C6-T1 WITH LATERAL MASS SCREWS    The patient c/o post operative pain. Pain is adequately controlled. The patient is making steady progress with PT. She denies CP, SOB, N/V/D, dizziness, abdominal pain, HA. +flatus, - BM. OBJECTIVE:     Vital Signs:    Temp (24hrs), Av.6 °F (37 °C), Min:98.1 °F (36.7 °C), Max:99.2 °F (37.3 °C)    Patient Vitals for the past 8 hrs:   BP Temp Pulse SpO2   02/15/17 0431 126/74 98.1 °F (36.7 °C) 69 95 %           Physical Exam:  General: A&Ox3, NAD. Respiratory: Respirations are unlabored. Abdomen: S/NT  Surgical site: C,D and I  Musculoskeletal: Calves are S/NT, Dorsi/plantar flexion intact, Benjy /intrinsics/wrist extension/biceps/triceps intact, DP 2+  Neurological:  Benjy UE's/LE's NVI - mild diminished sensation left vs right, consistent with pre op    Laboratory Values:             No results for input(s): HGB, PLT, INR, CREA, GLU, HGBEXT, PLTEXT in the last 72 hours. No lab exists for component: INREXT  Lab Results   Component Value Date/Time    Sodium 140 2017 11:13 AM    Potassium 4.8 2017 11:13 AM    Chloride 101 2017 11:13 AM    CO2 32 2017 11:13 AM    Glucose 85 2017 11:13 AM    BUN 8 2017 11:13 AM    Creatinine 0.85 2017 11:13 AM    Calcium 9.0 2017 11:13 AM       PLAN:     S/P POSTERIOR C7-T1 FORAMINOTOMY AND FUSION, C6-T1 WITH LATERAL MASS SCREWS Mobilize and continue with PT BID until discharged. Hemodynamics Stable. Wound Continue dressing changes PRN. Post Operative Pain Pain Control: Adequate, continue current regimen. DVT Prophylaxis Continue with SCD'S, Ankle Pump Exercises, Mobilize.        Discharge Disposition Discharge plan: Plan on home with HH today. Follow up in 3 weeks in office.        Signed By: Renetta King PA-C  February 15, 2017 9:10 AM

## 2017-02-15 NOTE — DISCHARGE INSTRUCTIONS
Gonzales Mar M.D. [address] [facility]  Office Phone: [phone]    Neck Surgery Discharge Instructions    Activities   You are going home a well person, be as active as possible. Your only exercise should be walking. Start with short frequent walks and increase your walking distance each day. Start with walking twice a day for 5 minutes and increase your distance each day 2-3 minutes until you reach 25 minutes twice a day. Limit the amount of time you sit to 20-30 minute intervals. Sitting for prolonged periods of time will be uncomfortable for you following your surgery.  Do not lift anything over five pounds, and do not do any bending or straining.  Avoid reaching overhead for 2-3 weeks   Do not do any neck exercises until you have been instructed by your doctor.  When you are in the bed, you may lay on your back or on either side. Do not lay on your stomach.  Continue using your incentive spirometer regularly for deep breathing exercises   You may resume sexual relations 2 weeks after your surgery    Diet   You may resume your normal diet. If your throat is sore, you may want to eat soft foods for a few days. Be sure to drink plenty of fluids, it is important to keep yourself hydrated. If you begin having trouble swallowing, call the office immediately.  Avoid alcoholic beverages and ABSOLUTELY NO tobacco products. Tobacco products will interfere with your healing. If you continue to use tobacco, you may end up needing another surgery in the future. Medications   Do not take anti-inflammatory medications or aspirin unless instructed by your physician.  Take your pain medication as directed.  Do NOT take additional Tylenol if your prescribed pain medication has acetaminophen in it (Endocet/Percocet, Lortab, Norco).  It is important to have regular bowel movements. Pain medications may cause constipation.   Stool softeners, prune juice, and increasing your water and fiber intake may help in preventing constipation.  Do NOT take laxatives if at all possible except in severe situations. It can results in a vicious cycle of constipation and diarrhea.  Do not be alarmed if you still have some of the same symptoms you had prior to surgery. The nerves often require time to heal after the pressure has been relieved. You may experience pain in your shoulders or between your shoulder blades, which is common after this surgery. The level of pain you experience should improve as your body heals. Driving   You may not drive or return to work until instructed by your physician. However, you may ride in the car for doctors appointments ONLY. Neck collar   You are required to wear your cervical collar at all times. You may remove the collar long enough to change the pads when needed and to change your dressing each day.  Do not bend or twist your neck when your collar is removed. It is best to have someone assist you when changing the pads and dressing to prevent you from bending your neck. Showering   You may shower in approximately 4 days after your surgery if your incision is not draining.  Leave the dressing on during your shower but avoid getting the dressing wet.  Do not use tub baths, swimming pools or Jacuzzis.  Neck collars may need to be worn even while in the shower. If your collar is removed for showering, be sure not to turn your neck while the collar is off. Also, make sure you put dry pads on your collar after your shower. Caring for your incision   Leave the clear plastic dressing on x 7 days after surgery. At that point, you may remove the dressing and keep the incision open to air.  You will probably have steri-strips on your incision (small, white pieces of tape). Do not pull the steri-strips; they will fall off on their own after several days.   If you have sutures or staples, they will be removed when you see your physician.  Do not rub or apply any lotions or ointments to your incision site. Follow Up   Once you are home, call your physicians office to schedule an appointment for your 3 week postoperative visit. Notify your physician if you develop any of the following conditions:   Fever above 101 degrees for 24 hours.  Nausea or vomiting.  Severe headache.  Inability to urinate.  Loss of bowel or bladder control (sudden onset of incontinence).  Changes in sensation in your extremities (numbness, tingling, loss of color).  Severe pain or pain not relieved by medications.  Redness, swelling, or drainage from your incision.  Persistent pain in the chest.    Pain in the calf of either leg.  Increased weakness (if this is greater than before your surgery).

## 2017-12-02 ENCOUNTER — HOSPITAL ENCOUNTER (EMERGENCY)
Age: 57
Discharge: HOME | End: 2017-12-03
Attending: EMERGENCY MEDICINE
Payer: MEDICARE

## 2017-12-02 DIAGNOSIS — R55 VASOVAGAL SYNDROME: Primary | ICD-10-CM

## 2017-12-02 DIAGNOSIS — S09.90XA INJURY OF HEAD, INITIAL ENCOUNTER: ICD-10-CM

## 2017-12-02 DIAGNOSIS — S01.81XA FACIAL LACERATION, INITIAL ENCOUNTER: ICD-10-CM

## 2017-12-02 PROCEDURE — 93005 ELECTROCARDIOGRAM TRACING: CPT

## 2017-12-02 PROCEDURE — 96374 THER/PROPH/DIAG INJ IV PUSH: CPT

## 2017-12-02 PROCEDURE — 96361 HYDRATE IV INFUSION ADD-ON: CPT

## 2017-12-02 PROCEDURE — 99285 EMERGENCY DEPT VISIT HI MDM: CPT

## 2017-12-03 ENCOUNTER — APPOINTMENT (OUTPATIENT)
Dept: GENERAL RADIOLOGY | Age: 57
End: 2017-12-03
Attending: EMERGENCY MEDICINE
Payer: MEDICARE

## 2017-12-03 ENCOUNTER — APPOINTMENT (OUTPATIENT)
Dept: CT IMAGING | Age: 57
End: 2017-12-03
Attending: EMERGENCY MEDICINE
Payer: MEDICARE

## 2017-12-03 VITALS
BODY MASS INDEX: 22.18 KG/M2 | WEIGHT: 138 LBS | RESPIRATION RATE: 16 BRPM | DIASTOLIC BLOOD PRESSURE: 84 MMHG | HEART RATE: 80 BPM | HEIGHT: 66 IN | TEMPERATURE: 98.2 F | SYSTOLIC BLOOD PRESSURE: 109 MMHG | OXYGEN SATURATION: 99 %

## 2017-12-03 LAB
ALBUMIN SERPL-MCNC: 3.8 G/DL (ref 3.5–5)
ALBUMIN/GLOB SERPL: 1 {RATIO} (ref 1.1–2.2)
ALP SERPL-CCNC: 121 U/L (ref 45–117)
ALT SERPL-CCNC: 17 U/L (ref 12–78)
ANION GAP SERPL CALC-SCNC: 6 MMOL/L (ref 5–15)
APPEARANCE UR: CLEAR
AST SERPL-CCNC: 13 U/L (ref 15–37)
ATRIAL RATE: 92 BPM
BACTERIA URNS QL MICRO: NEGATIVE /HPF
BASOPHILS # BLD: 0 K/UL (ref 0–0.1)
BASOPHILS NFR BLD: 0 % (ref 0–1)
BILIRUB SERPL-MCNC: 0.3 MG/DL (ref 0.2–1)
BILIRUB UR QL: NEGATIVE
BUN SERPL-MCNC: 11 MG/DL (ref 6–20)
BUN/CREAT SERPL: 12 (ref 12–20)
CALCIUM SERPL-MCNC: 8.7 MG/DL (ref 8.5–10.1)
CALCULATED P AXIS, ECG09: 55 DEGREES
CALCULATED R AXIS, ECG10: 51 DEGREES
CALCULATED T AXIS, ECG11: -29 DEGREES
CHLORIDE SERPL-SCNC: 102 MMOL/L (ref 97–108)
CO2 SERPL-SCNC: 29 MMOL/L (ref 21–32)
COLOR UR: ABNORMAL
CREAT SERPL-MCNC: 0.9 MG/DL (ref 0.55–1.02)
DIAGNOSIS, 93000: NORMAL
EOSINOPHIL # BLD: 0 K/UL (ref 0–0.4)
EOSINOPHIL NFR BLD: 0 % (ref 0–7)
EPITH CASTS URNS QL MICRO: ABNORMAL /LPF
ERYTHROCYTE [DISTWIDTH] IN BLOOD BY AUTOMATED COUNT: 13.6 % (ref 11.5–14.5)
GLOBULIN SER CALC-MCNC: 3.9 G/DL (ref 2–4)
GLUCOSE SERPL-MCNC: 88 MG/DL (ref 65–100)
GLUCOSE UR STRIP.AUTO-MCNC: NEGATIVE MG/DL
HCT VFR BLD AUTO: 38.4 % (ref 35–47)
HGB BLD-MCNC: 12.3 G/DL (ref 11.5–16)
HGB UR QL STRIP: ABNORMAL
KETONES UR QL STRIP.AUTO: NEGATIVE MG/DL
LEUKOCYTE ESTERASE UR QL STRIP.AUTO: NEGATIVE
LYMPHOCYTES # BLD: 1.9 K/UL (ref 0.8–3.5)
LYMPHOCYTES NFR BLD: 22 % (ref 12–49)
MCH RBC QN AUTO: 27.5 PG (ref 26–34)
MCHC RBC AUTO-ENTMCNC: 32 G/DL (ref 30–36.5)
MCV RBC AUTO: 85.9 FL (ref 80–99)
MONOCYTES # BLD: 0.8 K/UL (ref 0–1)
MONOCYTES NFR BLD: 9 % (ref 5–13)
NEUTS SEG # BLD: 6 K/UL (ref 1.8–8)
NEUTS SEG NFR BLD: 69 % (ref 32–75)
NITRITE UR QL STRIP.AUTO: NEGATIVE
P-R INTERVAL, ECG05: 150 MS
PH UR STRIP: 6 [PH] (ref 5–8)
PLATELET # BLD AUTO: 227 K/UL (ref 150–400)
POTASSIUM SERPL-SCNC: 3.8 MMOL/L (ref 3.5–5.1)
PROT SERPL-MCNC: 7.7 G/DL (ref 6.4–8.2)
PROT UR STRIP-MCNC: NEGATIVE MG/DL
Q-T INTERVAL, ECG07: 338 MS
QRS DURATION, ECG06: 70 MS
QTC CALCULATION (BEZET), ECG08: 417 MS
RBC # BLD AUTO: 4.47 M/UL (ref 3.8–5.2)
RBC #/AREA URNS HPF: ABNORMAL /HPF (ref 0–5)
SODIUM SERPL-SCNC: 137 MMOL/L (ref 136–145)
SP GR UR REFRACTOMETRY: 1.01 (ref 1–1.03)
TROPONIN I SERPL-MCNC: <0.04 NG/ML
UA: UC IF INDICATED,UAUC: ABNORMAL
UROBILINOGEN UR QL STRIP.AUTO: 0.2 EU/DL (ref 0.2–1)
VENTRICULAR RATE, ECG03: 92 BPM
WBC # BLD AUTO: 8.7 K/UL (ref 3.6–11)
WBC URNS QL MICRO: ABNORMAL /HPF (ref 0–4)

## 2017-12-03 PROCEDURE — 84484 ASSAY OF TROPONIN QUANT: CPT | Performed by: EMERGENCY MEDICINE

## 2017-12-03 PROCEDURE — 74011250636 HC RX REV CODE- 250/636: Performed by: EMERGENCY MEDICINE

## 2017-12-03 PROCEDURE — 74011250637 HC RX REV CODE- 250/637: Performed by: EMERGENCY MEDICINE

## 2017-12-03 PROCEDURE — L0172 CERV COL SR FOAM 2PC PRE OTS: HCPCS

## 2017-12-03 PROCEDURE — 72125 CT NECK SPINE W/O DYE: CPT

## 2017-12-03 PROCEDURE — 85025 COMPLETE CBC W/AUTO DIFF WBC: CPT | Performed by: EMERGENCY MEDICINE

## 2017-12-03 PROCEDURE — 80053 COMPREHEN METABOLIC PANEL: CPT | Performed by: EMERGENCY MEDICINE

## 2017-12-03 PROCEDURE — 70450 CT HEAD/BRAIN W/O DYE: CPT

## 2017-12-03 PROCEDURE — 96361 HYDRATE IV INFUSION ADD-ON: CPT

## 2017-12-03 PROCEDURE — 81001 URINALYSIS AUTO W/SCOPE: CPT | Performed by: EMERGENCY MEDICINE

## 2017-12-03 PROCEDURE — 36415 COLL VENOUS BLD VENIPUNCTURE: CPT | Performed by: EMERGENCY MEDICINE

## 2017-12-03 PROCEDURE — 71020 XR CHEST PA LAT: CPT

## 2017-12-03 PROCEDURE — 96374 THER/PROPH/DIAG INJ IV PUSH: CPT

## 2017-12-03 RX ORDER — MORPHINE SULFATE 2 MG/ML
6 INJECTION, SOLUTION INTRAMUSCULAR; INTRAVENOUS
Status: COMPLETED | OUTPATIENT
Start: 2017-12-03 | End: 2017-12-03

## 2017-12-03 RX ORDER — HYDROCODONE BITARTRATE AND ACETAMINOPHEN 7.5; 325 MG/1; MG/1
1 TABLET ORAL
Status: COMPLETED | OUTPATIENT
Start: 2017-12-03 | End: 2017-12-03

## 2017-12-03 RX ORDER — CYCLOBENZAPRINE HCL 10 MG
10 TABLET ORAL
COMMUNITY

## 2017-12-03 RX ADMIN — SODIUM CHLORIDE 1000 ML: 900 INJECTION, SOLUTION INTRAVENOUS at 00:51

## 2017-12-03 RX ADMIN — MORPHINE SULFATE 6 MG: 2 INJECTION, SOLUTION INTRAMUSCULAR; INTRAVENOUS at 00:50

## 2017-12-03 RX ADMIN — HYDROCODONE BITARTRATE AND ACETAMINOPHEN 1 TABLET: 7.5; 325 TABLET ORAL at 03:20

## 2017-12-03 NOTE — ED NOTES
Assumed care of patient from triage. Patient presents with chief complaint of neck and upper back pain following a syncopal event/fall this evening. Patient states she has been sick with a respiratory infection x2 days, and has been experiencing GI symptoms/diarrhea x3 days, and has been unable to eat a significant meal for 3 days. She states she experienced a bout of dizziness this afternoon, \"blacked out,\" and woke up with upper back/neck pain. Patient also presents with pain to left shoulder and laceration above left eye. Patient has significant spinal surgical history, and reports increased pain since this event. Patient is alert and oriented x4, respirations even and unlabored, VSS. Monitor x2, call bell within reach.

## 2017-12-03 NOTE — ED NOTES
Patient requesting additional pain medications. MD aware. Patient resting in stretcher at this time. Will continue to monitor.

## 2017-12-03 NOTE — ED NOTES
Discharge instructions reviewed with patient. Discharge instructions given to patient per Dr. Clark Lal. Patient able to return/verbalize discharge instructions. Copy of discharge instructions provided. Patient condition stable, respiratory status within normal limits, neuro status intact. Ambulatory out of ER, accompanied by friend.

## 2017-12-03 NOTE — DISCHARGE INSTRUCTIONS
Vasovagal Syncope: Care Instructions  Your Care Instructions    Vasovagal syncope (say \"jwv-kca-JUMMichaelle CAREY-kuh-pee\")is sudden dizziness or fainting that can be set off by things such as pain, stress, fear, or trauma. You may sweat or feel lightheaded, sick to your stomach, or tingly. The problem causes the heart rate to slow and the blood vessels to widen, or dilate, for a short time. When this happens, blood pools in the lower body, and less blood goes to the brain. You can usually get relief by lying down with your legs raised (elevated). This helps more blood to flow to your brain and may help relieve symptoms like feeling dizzy. Some doctors may recommend a technique that involves tensing your fists and arms. This type of fainting is often easy to predict. For example, it happens to some people when they see blood or have to get a shot. They may feel symptoms before they faint. An episode of vasovagal syncope usually responds well to self-care. Other treatment often isn't needed. But if the fainting keeps happening, your doctor may suggest further treatments. Follow-up care is a key part of your treatment and safety. Be sure to make and go to all appointments, and call your doctor if you are having problems. It's also a good idea to know your test results and keep a list of the medicines you take. How can you care for yourself at home? · Drink plenty of fluids to prevent dehydration. If you have kidney, heart, or liver disease and have to limit fluids, talk with your doctor before you increase your fluid intake. · Try to avoid things that you think may set off vasovagal syncope. · Talk to your doctor about any medicines you take. Some medicines may increase the chance of this condition occurring. · If you feel symptoms, lie down with your legs raised. Talk to your doctor about what to do if your symptoms come back. When should you call for help?   Call 911 anytime you think you may need emergency care. For example, call if:  ? · You have symptoms of a heart problem. These may include:  ¨ Chest pain or pressure. ¨ Severe trouble breathing. ¨ A fast or irregular heartbeat. ? Watch closely for changes in your health, and be sure to contact your doctor if:  ? · You have more episodes of fainting at home. ? · You do not get better as expected. Where can you learn more? Go to http://laverne-liliana.info/. Enter L754 in the search box to learn more about \"Vasovagal Syncope: Care Instructions. \"  Current as of: March 20, 2017  Content Version: 11.4  © 1761-9478 "[employer] Care instructions adapted under license by [employer] (which disclaims liability or warranty for this information). If you have questions about a medical condition or this instruction, always ask your healthcare professional. [place] [address] any warranty or liability for your use of this information. Learning About a Closed Head Injury  What is a closed head injury? A closed head injury happens when your head gets hit hard. The strong force of the blow causes your brain to shake in your skull. This movement can cause the brain to bruise, swell, or tear. Sometimes nerves or blood vessels also get damaged. This can cause bleeding in or around the brain. A concussion is a type of closed head injury. What are the symptoms? If you have a mild concussion, you may have a mild headache or feel \"not quite right. \" These symptoms are common. They usually go away over a few days to 4 weeks. But sometimes after a concussion, you feel like you can't function as well as before the injury. And you have new symptoms. This is called postconcussive syndrome. You may:  · Find it harder to solve problems, think, concentrate, or remember. · Have headaches. · Have changes in your sleep patterns, such as not being able to sleep or sleeping all the time.   · Have changes in your personality. · Not be interested in your usual activities. · Feel angry or anxious without a clear reason. · Lose your sense of taste or smell. · Be dizzy, lightheaded, or unsteady. It may be hard to stand or walk. How is a closed head injury treated? Any person who may have a concussion needs to see a doctor. Some people have to stay in the hospital to be watched. Others can go home safely. If you go home, follow your doctor's instructions. He or she will tell you if you need someone to watch you closely for the next 24 hours or longer. Rest is the best treatment. Get plenty of sleep at night. And try to rest during the day. · Avoid activities that are physically or mentally demanding. These include housework, exercise, and schoolwork. And don't play video games, send text messages, or use the computer. You may need to change your school or work schedule to be able to avoid these activities. · Ask your doctor when it's okay to drive, ride a bike, or operate machinery. · Take an over-the-counter pain medicine, such as acetaminophen (Tylenol), ibuprofen (Advil, Motrin), or naproxen (Aleve). Be safe with medicines. Read and follow all instructions on the label. · Check with your doctor before you use any other medicines for pain. · Do not drink alcohol or use illegal drugs. They can slow recovery. They can also increase your risk of getting a second head injury. Follow-up care is a key part of your treatment and safety. Be sure to make and go to all appointments, and call your doctor if you are having problems. It's also a good idea to know your test results and keep a list of the medicines you take. Where can you learn more? Go to http://laverne-liliana.info/. Enter E235 in the search box to learn more about \"Learning About a Closed Head Injury. \"  Current as of: October 14, 2016  Content Version: 11.4  © 1844-6043 [employer].  Care instructions adapted under license by [employer] (which disclaims liability or warranty for this information). If you have questions about a medical condition or this instruction, always ask your healthcare professional. [address] any warranty or liability for your use of this information.

## 2018-05-31 ENCOUNTER — APPOINTMENT (OUTPATIENT)
Dept: GENERAL RADIOLOGY | Age: 58
End: 2018-05-31
Attending: NURSE PRACTITIONER
Payer: MEDICARE

## 2018-05-31 ENCOUNTER — HOSPITAL ENCOUNTER (EMERGENCY)
Age: 58
Discharge: HOME | End: 2018-05-31
Attending: STUDENT IN AN ORGANIZED HEALTH CARE EDUCATION/TRAINING PROGRAM
Payer: MEDICARE

## 2018-05-31 VITALS
HEART RATE: 98 BPM | DIASTOLIC BLOOD PRESSURE: 74 MMHG | SYSTOLIC BLOOD PRESSURE: 100 MMHG | WEIGHT: 158.13 LBS | RESPIRATION RATE: 16 BRPM | TEMPERATURE: 98.6 F | OXYGEN SATURATION: 98 % | BODY MASS INDEX: 23.42 KG/M2 | HEIGHT: 69 IN

## 2018-05-31 DIAGNOSIS — J41.0 SIMPLE CHRONIC BRONCHITIS (HCC): Primary | ICD-10-CM

## 2018-05-31 DIAGNOSIS — G89.29 CHRONIC PAIN OF LEFT KNEE: ICD-10-CM

## 2018-05-31 DIAGNOSIS — M25.562 CHRONIC PAIN OF LEFT KNEE: ICD-10-CM

## 2018-05-31 LAB
ALBUMIN SERPL-MCNC: 4.1 G/DL (ref 3.5–5)
ALBUMIN/GLOB SERPL: 1 {RATIO} (ref 1.1–2.2)
ALP SERPL-CCNC: 127 U/L (ref 45–117)
ALT SERPL-CCNC: 15 U/L (ref 12–78)
ANION GAP SERPL CALC-SCNC: 8 MMOL/L (ref 5–15)
AST SERPL-CCNC: 13 U/L (ref 15–37)
BASOPHILS # BLD: 0 K/UL (ref 0–0.1)
BASOPHILS NFR BLD: 0 % (ref 0–1)
BILIRUB SERPL-MCNC: 0.3 MG/DL (ref 0.2–1)
BUN SERPL-MCNC: 9 MG/DL (ref 6–20)
BUN/CREAT SERPL: 10 (ref 12–20)
CALCIUM SERPL-MCNC: 9.4 MG/DL (ref 8.5–10.1)
CHLORIDE SERPL-SCNC: 105 MMOL/L (ref 97–108)
CO2 SERPL-SCNC: 28 MMOL/L (ref 21–32)
CREAT SERPL-MCNC: 0.86 MG/DL (ref 0.55–1.02)
DIFFERENTIAL METHOD BLD: NORMAL
EOSINOPHIL # BLD: 0 K/UL (ref 0–0.4)
EOSINOPHIL NFR BLD: 0 % (ref 0–7)
ERYTHROCYTE [DISTWIDTH] IN BLOOD BY AUTOMATED COUNT: 13.2 % (ref 11.5–14.5)
GLOBULIN SER CALC-MCNC: 4 G/DL (ref 2–4)
GLUCOSE SERPL-MCNC: 113 MG/DL (ref 65–100)
HCT VFR BLD AUTO: 40.6 % (ref 35–47)
HGB BLD-MCNC: 12.9 G/DL (ref 11.5–16)
IMM GRANULOCYTES # BLD: 0 K/UL (ref 0–0.04)
IMM GRANULOCYTES NFR BLD AUTO: 0 % (ref 0–0.5)
LYMPHOCYTES # BLD: 2.2 K/UL (ref 0.8–3.5)
LYMPHOCYTES NFR BLD: 28 % (ref 12–49)
MCH RBC QN AUTO: 28.3 PG (ref 26–34)
MCHC RBC AUTO-ENTMCNC: 31.8 G/DL (ref 30–36.5)
MCV RBC AUTO: 89 FL (ref 80–99)
MONOCYTES # BLD: 0.5 K/UL (ref 0–1)
MONOCYTES NFR BLD: 6 % (ref 5–13)
NEUTS SEG # BLD: 5.2 K/UL (ref 1.8–8)
NEUTS SEG NFR BLD: 66 % (ref 32–75)
NRBC # BLD: 0 K/UL (ref 0–0.01)
NRBC BLD-RTO: 0 PER 100 WBC
PLATELET # BLD AUTO: 255 K/UL (ref 150–400)
PMV BLD AUTO: 10.6 FL (ref 8.9–12.9)
POTASSIUM SERPL-SCNC: 3.9 MMOL/L (ref 3.5–5.1)
PROT SERPL-MCNC: 8.1 G/DL (ref 6.4–8.2)
RBC # BLD AUTO: 4.56 M/UL (ref 3.8–5.2)
SODIUM SERPL-SCNC: 141 MMOL/L (ref 136–145)
WBC # BLD AUTO: 8 K/UL (ref 3.6–11)

## 2018-05-31 PROCEDURE — 71046 X-RAY EXAM CHEST 2 VIEWS: CPT

## 2018-05-31 PROCEDURE — 80053 COMPREHEN METABOLIC PANEL: CPT | Performed by: NURSE PRACTITIONER

## 2018-05-31 PROCEDURE — 85025 COMPLETE CBC W/AUTO DIFF WBC: CPT | Performed by: NURSE PRACTITIONER

## 2018-05-31 PROCEDURE — 99282 EMERGENCY DEPT VISIT SF MDM: CPT

## 2018-05-31 PROCEDURE — 94640 AIRWAY INHALATION TREATMENT: CPT

## 2018-05-31 PROCEDURE — 77030029684 HC NEB SM VOL KT MONA -A

## 2018-05-31 PROCEDURE — 36415 COLL VENOUS BLD VENIPUNCTURE: CPT | Performed by: NURSE PRACTITIONER

## 2018-05-31 PROCEDURE — 74011000250 HC RX REV CODE- 250: Performed by: STUDENT IN AN ORGANIZED HEALTH CARE EDUCATION/TRAINING PROGRAM

## 2018-05-31 RX ORDER — DOXYCYCLINE HYCLATE 100 MG
100 TABLET ORAL 2 TIMES DAILY
Qty: 14 TAB | Refills: 0 | Status: SHIPPED | OUTPATIENT
Start: 2018-05-31 | End: 2018-06-07

## 2018-05-31 RX ORDER — ALBUTEROL SULFATE 0.83 MG/ML
SOLUTION RESPIRATORY (INHALATION)
Status: DISCONTINUED
Start: 2018-05-31 | End: 2018-05-31 | Stop reason: HOSPADM

## 2018-05-31 RX ORDER — PREDNISONE 50 MG/1
50 TABLET ORAL DAILY
Qty: 3 TAB | Refills: 0 | Status: SHIPPED | OUTPATIENT
Start: 2018-05-31 | End: 2018-06-03

## 2018-05-31 RX ADMIN — ALBUTEROL SULFATE 1 DOSE: 2.5 SOLUTION RESPIRATORY (INHALATION) at 12:57
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
## 2018-05-31 NOTE — ED TRIAGE NOTES
Pt stated she has been sick x one week, no appetite, +sore throat, +fever 101, denies n/v, +chest congestion, +nasal congestion, +cough- olive green sputum , pt also having left knee pain. ...................... Sher Mcdaniel pt rambling in triage

## 2018-05-31 NOTE — ED NOTES
11:50 AM  I have evaluated the patient as the Provider in Triage. I have reviewed Her vital signs and the triage nurse assessment. I have talked with the patient and any available family and advised that I am the provider in triage and have ordered the appropriate study to initiate their work up based on the clinical presentation during my assessment. I have advised that the patient will be accommodated in the Main ED as soon as possible. I have also requested to contact the triage nurse or myself immediately if the patient experiences any changes in their condition during this brief waiting period. +shortness of breath and chest congestion. Reports fever up to 101 at home over the past week.  +smoker  Elliot Matos, NP

## 2018-05-31 NOTE — DISCHARGE INSTRUCTIONS
Knee Pain or Injury: Care Instructions  Your Care Instructions    Injuries are a common cause of knee problems. Sudden (acute) injuries may be caused by a direct blow to the knee. They can also be caused by abnormal twisting, bending, or falling on the knee. Pain, bruising, or swelling may be severe, and may start within minutes of the injury. Overuse is another cause of knee pain. Other causes are climbing stairs, kneeling, and other activities that use the knee. Everyday wear and tear, especially as you get older, also can cause knee pain. Rest, along with home treatment, often relieves pain and allows your knee to heal. If you have a serious knee injury, you may need tests and treatment. Follow-up care is a key part of your treatment and safety. Be sure to make and go to all appointments, and call your doctor if you are having problems. It's also a good idea to know your test results and keep a list of the medicines you take. How can you care for yourself at home? · Be safe with medicines. Read and follow all instructions on the label. ¨ If the doctor gave you a prescription medicine for pain, take it as prescribed. ¨ If you are not taking a prescription pain medicine, ask your doctor if you can take an over-the-counter medicine. · Rest and protect your knee. Take a break from any activity that may cause pain. · Put ice or a cold pack on your knee for 10 to 20 minutes at a time. Put a thin cloth between the ice and your skin. · Prop up a sore knee on a pillow when you ice it or anytime you sit or lie down for the next 3 days. Try to keep it above the level of your heart. This will help reduce swelling. · If your knee is not swollen, you can put moist heat, a heating pad, or a warm cloth on your knee. · If your doctor recommends an elastic bandage, sleeve, or other type of support for your knee, wear it as directed.   · Follow your doctor's instructions about how much weight you can put on your leg. Use a cane, crutches, or a walker as instructed. · Follow your doctor's instructions about activity during your healing process. If you can do mild exercise, slowly increase your activity. · Reach and stay at a healthy weight. Extra weight can strain the joints, especially the knees and hips, and make the pain worse. Losing even a few pounds may help. When should you call for help? Call 911 anytime you think you may need emergency care. For example, call if:  ? · You have symptoms of a blood clot in your lung (called a pulmonary embolism). These may include:  ¨ Sudden chest pain. ¨ Trouble breathing. ¨ Coughing up blood. ?Call your doctor now or seek immediate medical care if:  ? · You have severe or increasing pain. ? · Your leg or foot turns cold or changes color. ? · You cannot stand or put weight on your knee. ? · Your knee looks twisted or bent out of shape. ? · You cannot move your knee. ? · You have signs of infection, such as:  ¨ Increased pain, swelling, warmth, or redness. ¨ Red streaks leading from the knee. ¨ Pus draining from a place on your knee. ¨ A fever. ? · You have signs of a blood clot in your leg (called a deep vein thrombosis), such as:  ¨ Pain in your calf, back of the knee, thigh, or groin. ¨ Redness and swelling in your leg or groin. ? Watch closely for changes in your health, and be sure to contact your doctor if:  ? · You have tingling, weakness, or numbness in your knee. ? · You have any new symptoms, such as swelling. ? · You have bruises from a knee injury that last longer than 2 weeks. ? · You do not get better as expected. Where can you learn more? Go to http://laverne-liliana.info/. Enter K195 in the search box to learn more about \"Knee Pain or Injury: Care Instructions. \"  Current as of: March 20, 2017  Content Version: 11.4  © 0609-9951 [employer].  Care instructions adapted under license by [employer] (which disclaims liability or warranty for this information). If you have questions about a medical condition or this instruction, always ask your healthcare professional. [address] any warranty or liability for your use of this information. Bronchitis: Care Instructions  Your Care Instructions    Bronchitis is inflammation of the bronchial tubes, which carry air to the lungs. The tubes swell and produce mucus, or phlegm. The mucus and inflamed bronchial tubes make you cough. You may have trouble breathing. Most cases of bronchitis are caused by viruses like those that cause colds. Antibiotics usually do not help and they may be harmful. Bronchitis usually develops rapidly and lasts about 2 to 3 weeks in otherwise healthy people. Follow-up care is a key part of your treatment and safety. Be sure to make and go to all appointments, and call your doctor if you are having problems. It's also a good idea to know your test results and keep a list of the medicines you take. How can you care for yourself at home? · Take all medicines exactly as prescribed. Call your doctor if you think you are having a problem with your medicine. · Get some extra rest.  · Take an over-the-counter pain medicine, such as acetaminophen (Tylenol), ibuprofen (Advil, Motrin), or naproxen (Aleve) to reduce fever and relieve body aches. Read and follow all instructions on the label. · Do not take two or more pain medicines at the same time unless the doctor told you to. Many pain medicines have acetaminophen, which is Tylenol. Too much acetaminophen (Tylenol) can be harmful. · Take an over-the-counter cough medicine that contains dextromethorphan to help quiet a dry, hacking cough so that you can sleep. Avoid cough medicines that have more than one active ingredient. Read and follow all instructions on the label. · Breathe moist air from a humidifier, hot shower, or sink filled with hot water.  The heat and moisture will thin mucus so you can cough it out. · Do not smoke. Smoking can make bronchitis worse. If you need help quitting, talk to your doctor about stop-smoking programs and medicines. These can increase your chances of quitting for good. When should you call for help? Call 911 anytime you think you may need emergency care. For example, call if:  ? · You have severe trouble breathing. ?Call your doctor now or seek immediate medical care if:  ? · You have new or worse trouble breathing. ? · You cough up dark brown or bloody mucus (sputum). ? · You have a new or higher fever. ? · You have a new rash. ? Watch closely for changes in your health, and be sure to contact your doctor if:  ? · You cough more deeply or more often, especially if you notice more mucus or a change in the color of your mucus. ? · You are not getting better as expected. Where can you learn more? Go to http://laverne-liliana.info/. Enter H333 in the search box to learn more about \"Bronchitis: Care Instructions. \"  Current as of: May 12, 2017  Content Version: 11.4  © 3507-4927 [employer]. Care instructions adapted under license by [employer] (which disclaims liability or warranty for this information). If you have questions about a medical condition or this instruction, always ask your healthcare professional. [address] any warranty or liability for your use of this information.

## 2018-05-31 NOTE — ED NOTES
Pt discharged in stable condition at this time. Pt denies any needs or questions regarding prescriptions, follow up or POC. Pt ambulatory to exit.

## 2018-11-08 ENCOUNTER — HOSPITAL ENCOUNTER (OUTPATIENT)
Dept: MRI IMAGING | Age: 58
Discharge: HOME | End: 2018-11-08
Attending: ORTHOPAEDIC SURGERY
Payer: MEDICARE

## 2018-11-08 DIAGNOSIS — M54.12 CERVICAL RADICULITIS: ICD-10-CM

## 2018-11-08 PROCEDURE — 72141 MRI NECK SPINE W/O DYE: CPT

## 2019-01-17 ENCOUNTER — HOSPITAL ENCOUNTER (OUTPATIENT)
Dept: CT IMAGING | Age: 59
Discharge: HOME | End: 2019-01-17
Attending: ORTHOPAEDIC SURGERY
Payer: MEDICARE

## 2019-01-17 DIAGNOSIS — M54.12 CERVICAL RADICULOPATHY: ICD-10-CM

## 2019-01-17 PROCEDURE — 72125 CT NECK SPINE W/O DYE: CPT

## 2020-10-30 PROBLEM — M54.12 CERVICAL RADICULITIS: Status: ACTIVE | Noted: 2017-06-15

## 2020-10-30 PROBLEM — Z98.890 HISTORY OF LAMINECTOMY: Status: ACTIVE | Noted: 2017-06-15

## 2020-10-30 PROBLEM — M54.2 NECK PAIN: Status: ACTIVE | Noted: 2018-01-22

## 2020-10-30 PROBLEM — Z98.1 HISTORY OF CERVICAL SPINAL ARTHRODESIS: Status: ACTIVE | Noted: 2017-06-15

## 2020-10-30 PROBLEM — M47.22 OSTEOARTHRITIS OF SPINE WITH RADICULOPATHY, CERVICAL REGION: Status: ACTIVE | Noted: 2017-06-15

## 2020-10-30 PROBLEM — M54.50 LOW BACK PAIN: Status: ACTIVE | Noted: 2018-01-22
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
## (undated) DEVICE — 3M™ DURAPORE™ SURGICAL TAPE 1538-3, 3 INCH X 10 YARD (7,5CM X 9,1M), 4 ROLLS/BOX: Brand: 3M™ DURAPORE™

## (undated) DEVICE — (D)SYR 10ML 1/5ML GRAD NSAF -- PKGING CHANGE USE ITEM 338027

## (undated) DEVICE — BASIC PACK: Brand: CONVERTORS

## (undated) DEVICE — 3M™ IOBAN™ 2 ANTIMICROBIAL INCISE DRAPE 6650EZ: Brand: IOBAN™ 2

## (undated) DEVICE — TRAP SUC MUCOUS 70ML -- MEDICHOICE MEDLINE

## (undated) DEVICE — SUTURE VCRL SZ 0 L36IN ABSRB UD L36MM CT-1 1/2 CIR J946H

## (undated) DEVICE — BIPOLAR FORCEPS CORD,BANANA LEADS: Brand: VALLEYLAB

## (undated) DEVICE — TOWEL SURG W17XL27IN STD BLU COT NONFENESTRATED PREWASHED

## (undated) DEVICE — ASTOUND STANDARD SURGICAL GOWN, XL: Brand: CONVERTORS

## (undated) DEVICE — STERILE POLYISOPRENE POWDER-FREE SURGICAL GLOVES WITH EMOLLIENT COATING: Brand: PROTEXIS

## (undated) DEVICE — Z CONVERTED USE 2271043 CONTAINER SPEC COLL 4OZ SCR ON LID PEEL PCH

## (undated) DEVICE — ROCKER SWITCH PENCIL BLADE ELECTRODE, HOLSTER: Brand: EDGE

## (undated) DEVICE — NDL SPNE QNCKE 18GX3.5IN LF --

## (undated) DEVICE — COVER,TABLE,60X90,STERILE: Brand: MEDLINE

## (undated) DEVICE — NEEDLE HYPO 22GA L1.5IN BLK S STL HUB POLYPR SHLD REG BVL

## (undated) DEVICE — DRAPE,REIN 53X77,STERILE: Brand: MEDLINE

## (undated) DEVICE — SUTURE VCRL SZ 2-0 L27IN ABSRB UD L26MM CT-2 1/2 CIR J269H

## (undated) DEVICE — SUTURE ABSRB BRAID COAT UD CT NO 1 36IN VCRL J959H

## (undated) DEVICE — SOLUTION IRRIG 1000ML H2O STRL BLT

## (undated) DEVICE — KENDALL SCD EXPRESS SLEEVES, KNEE LENGTH, MEDIUM: Brand: KENDALL SCD

## (undated) DEVICE — MAYFIELD® DISPOSABLE ADULT SKULL PIN (PLASTIC BASE): Brand: MAYFIELD®

## (undated) DEVICE — SYRINGE MED 20ML STD CLR PLAS LUERLOCK TIP N CTRL DISP

## (undated) DEVICE — CODMAN® SURGICAL PATTIES 3/4" X 3/4" (1.91CM X 1.91CM): Brand: CODMAN®

## (undated) DEVICE — X-RAY SPONGES,16 PLY: Brand: DERMACEA

## (undated) DEVICE — DRILL 12MM

## (undated) DEVICE — SHEET, T, LAPAROTOMY, STERILE: Brand: MEDLINE

## (undated) DEVICE — COLLAR CERV UNIV ADJ AD -- VISTA MULTIPOST

## (undated) DEVICE — 3000CC GUARDIAN II: Brand: GUARDIAN

## (undated) DEVICE — PREP CHLORAPREP 10.5 ML ORG --

## (undated) DEVICE — PACKING 8004003 NEURAY 200PK 25X25MM: Brand: NEURAY ®

## (undated) DEVICE — PACKING 8004000 NEURAY 200PK 13X13MM: Brand: NEURAY ®

## (undated) DEVICE — CATHETER IV 14GA L1.25IN TEF STR HUB INTROCAN SFTY

## (undated) DEVICE — STERILE POLYISOPRENE POWDER-FREE SURGICAL GLOVES: Brand: PROTEXIS

## (undated) DEVICE — DRAPE XR C ARM 41X74IN LF --

## (undated) DEVICE — BONE WAX WHITE: Brand: BONE WAX WHITE

## (undated) DEVICE — KIT POS W/ FOAM ARM CRADL SHEARGUARD CHST PD CVR FOR SPNL

## (undated) DEVICE — GOWN,SIRUS,NONRNF,SETINSLV,2XL,18/CS: Brand: MEDLINE

## (undated) DEVICE — TIP CLEANER: Brand: VALLEYLAB

## (undated) DEVICE — ELECTRODE BLDE L4IN NONINSULATED EDGE

## (undated) DEVICE — MAGNETIC DRAPE: Brand: DEVON

## (undated) DEVICE — SURGICAL PROCEDURE PACK BASIN MAJ SET CUST NO CAUT

## (undated) DEVICE — SOLUTION IV 1000ML 0.9% SOD CHL

## (undated) DEVICE — COVER LT HNDL BLU PLAS

## (undated) DEVICE — TRAY CATH 16F DRN BG LTX -- CONVERT TO ITEM 363158

## (undated) DEVICE — SPONGE: SPECIALTY PEANUT XR 100/CS: Brand: MEDICAL ACTION INDUSTRIES

## (undated) DEVICE — 1010 S-DRAPE TOWEL DRAPE 10/BX: Brand: STERI-DRAPE™

## (undated) DEVICE — KIT INFECTION CTRL ST FRAN --

## (undated) DEVICE — 3.0MM PRECISION NEURO (MATCH HEAD)